# Patient Record
Sex: MALE | Race: WHITE | Employment: OTHER | URBAN - METROPOLITAN AREA
[De-identification: names, ages, dates, MRNs, and addresses within clinical notes are randomized per-mention and may not be internally consistent; named-entity substitution may affect disease eponyms.]

---

## 2017-06-09 ENCOUNTER — TELEPHONE (OUTPATIENT)
Dept: DERMATOLOGY | Facility: AMBULATORY SURGERY CENTER | Age: 57
End: 2017-06-09

## 2017-06-09 NOTE — TELEPHONE ENCOUNTER
Patient Appointment Date: 06/26/2017      Asim Gregory, 60 y.o., male  Is calling for their Mohs Pre-Op Assessment    does not have Hepatitis C or HIV (If YES, set up consult appointment)  does confirm site (if pathology available)    Allergies:  No Known Allergies      does not have a Pacemaker  does not have a Defibrillator    does not need antibiotics      is not taking NSAIDs  . is taking aspirin  If yes, is taking because of arthritis     is not taking garlic  is not taking ginkgo  is not taking Ginseng  is taking fish oils  is not taking vit E    does not take a blood thinner    is not taking Coumadin/Warfarin       Pre operative assessment questions asked to patient. Patient has a general understanding of the procedure, and has been versed that there will be local anesthesia used in the procedure and that He will be ok to drive themselves to and from the appointment.

## 2017-06-26 ENCOUNTER — OFFICE VISIT (OUTPATIENT)
Dept: DERMATOLOGY | Facility: AMBULATORY SURGERY CENTER | Age: 57
End: 2017-06-26

## 2017-06-26 VITALS
HEIGHT: 70 IN | WEIGHT: 195 LBS | HEART RATE: 70 BPM | BODY MASS INDEX: 27.92 KG/M2 | SYSTOLIC BLOOD PRESSURE: 123 MMHG | DIASTOLIC BLOOD PRESSURE: 71 MMHG | OXYGEN SATURATION: 97 % | TEMPERATURE: 98.2 F

## 2017-06-26 DIAGNOSIS — C44.229 SQUAMOUS CELL CARCINOMA, EAR, LEFT: Primary | ICD-10-CM

## 2017-06-26 RX ORDER — BUPIVACAINE HYDROCHLORIDE AND EPINEPHRINE 2.5; 5 MG/ML; UG/ML
INJECTION, SOLUTION INFILTRATION; PERINEURAL
Qty: 1.5 ML | Refills: 0
Start: 2017-06-26 | End: 2018-11-27

## 2017-06-26 RX ORDER — ASPIRIN 325 MG
325 TABLET ORAL DAILY
COMMUNITY
End: 2018-11-28

## 2017-06-26 RX ORDER — METFORMIN HYDROCHLORIDE 1000 MG/1
1000 TABLET ORAL 2 TIMES DAILY WITH MEALS
COMMUNITY
Start: 2017-04-02

## 2017-06-26 RX ORDER — GLIPIZIDE 10 MG/1
10 TABLET ORAL 2 TIMES DAILY
COMMUNITY
Start: 2017-03-20

## 2017-06-26 RX ORDER — LOSARTAN POTASSIUM 100 MG/1
100 TABLET ORAL DAILY
COMMUNITY
Start: 2017-03-20

## 2017-06-26 RX ORDER — EMPAGLIFLOZIN 25 MG/1
TABLET, FILM COATED ORAL
Refills: 0 | COMMUNITY
Start: 2017-06-12

## 2017-06-26 RX ORDER — LANOLIN ALCOHOL/MO/W.PET/CERES
1000 CREAM (GRAM) TOPICAL DAILY
COMMUNITY

## 2017-06-26 RX ORDER — GLUCOSAMINE SULFATE 1500 MG
2000 POWDER IN PACKET (EA) ORAL DAILY
COMMUNITY

## 2017-06-26 RX ORDER — ROSUVASTATIN CALCIUM 20 MG/1
20 TABLET, COATED ORAL
COMMUNITY

## 2017-06-26 RX ORDER — LIDOCAINE HYDROCHLORIDE AND EPINEPHRINE 10; 10 MG/ML; UG/ML
3 INJECTION, SOLUTION INFILTRATION; PERINEURAL ONCE
Qty: 3 ML | Refills: 0
Start: 2017-06-26 | End: 2017-06-26

## 2017-06-26 RX ORDER — FENOFIBRATE 160 MG/1
160 TABLET ORAL DAILY
COMMUNITY
Start: 2017-05-26

## 2017-06-26 NOTE — MR AVS SNAPSHOT
Visit Information Date & Time Provider Department Dept. Phone Encounter #  
 6/26/2017  8:30 AM John Jung MD [facility] [id number] [phone] [id number] Upcoming Health Maintenance Date Due Hepatitis C Screening 1960 DTaP/Tdap/Td series (1 - Tdap) 6/22/1979 FOBT Q 1 YEAR AGE 50-75 6/22/2008 INFLUENZA AGE 9 TO ADULT 8/1/2017 Allergies as of 6/26/2017  Review Complete On: 6/26/2017 By: John Jung MD  
  
 Severity Noted Reaction Type Reactions Other Food  06/26/2017    Nausea and Vomiting Green peppers Shellfish Derived  06/26/2017    Nausea and Vomiting Current Immunizations  Never Reviewed No immunizations on file. Not reviewed this visit You Were Diagnosed With   
  
 Codes Comments Squamous cell carcinoma, ear, left    -  Primary ICD-10-CM: W27.637 ICD-9-CM: 173.22 Vitals BP Pulse Temp Height(growth percentile) Weight(growth percentile) SpO2  
 123/71 (BP 1 Location: Right arm, BP Patient Position: Sitting) 70 98.2 °F (36.8 °C) (Oral) 5' 10\" (1.778 m) 195 lb (88.5 kg) 97% BMI Smoking Status 27.98 kg/m2 Never Smoker BMI and BSA Data Body Mass Index Body Surface Area  
 27.98 kg/m 2 2.09 m 2 Your Updated Medication List  
  
   
This list is accurate as of: 6/26/17  9:09 AM.  Always use your most recent med list.  
  
  
  
  
 aspirin 325 mg tablet Commonly known as:  ASPIRIN Take 325 mg by mouth daily. CRESTOR 20 mg tablet Generic drug:  rosuvastatin Take 20 mg by mouth nightly. cyanocobalamin 1,000 mcg tablet Take 1,000 mcg by mouth daily. fenofibrate 160 mg tablet Commonly known as:  LOFIBRA FISH -160-1,000 mg Cap Generic drug:  omega 3-dha-epa-fish oil Take  by mouth. glipiZIDE 10 mg tablet Commonly known as:  Jose Angel Klippel JARDIANCE 25 mg tablet Generic drug:  empagliflozin TAKE 1 TABLET BY MOUTH DAILY EVERY MORNING  
  
 losartan 100 mg tablet Commonly known as:  COZAAR  
  
 metFORMIN 1,000 mg tablet Commonly known as:  GLUCOPHAGE  
  
 VITAMIN D3 1,000 unit Cap Generic drug:  cholecalciferol Take  by mouth daily. Patient Instructions WOUND CARE INSTRUCTIONS 1. Keep the dressing clean and dry and do not remove for 48 hours. 2. Then change the dressing once a day as follows: 
a. Wash hands before and after each dressing change. b. Remove dressing and wash site gently with mild soap and water, rinse, and pat dry. 
c. Apply an ointment (Bacitracin, Polysporin, Neosporin, Petroleum jelly or Aquaphor). d. Apply a non-stick (Telfa) dressing or Band-Aid to cover the wound. Remove pressure bandage Wednesday, then wash gently and apply Vaseline and a band-aid to site daily for 1 week. 3. Watch for: BLEEDING: A small amount of drainage may occur. If bleeding occurs, elevate and rest the surgery site. Apply gauze and steady pressure for 15 minutes. If bleeding continues, call this office. INFECTION: Signs of infection include increased redness, pain, warmth, drainage of pus, and fever. If this occurs, call this office. 4. Special Instructions (follow any that are checked): ·  You have stitches that need to be removed in 0 days ·  Avoid bending at the waist and heavy lifting for two days. ·  Sleep with your head elevated for the next two nights. ·  Rest the surgery site and keep it elevated as much as possible for two days. ·  You may apply an ice-pack for 10-15 minutes every waking hour for the rest of the day. ·  Eat a soft diet and avoid hot food and hot drinks for the rest of the day. ·  Other instructions: Follow up as directed Take Tylenol for pain as needed.  
 
 
Once the site is healed with no remaining bandages or open areas, protect your surgical site and scar from the sun, as this area will be more sensitive. Use a broad spectrum sunscreen SPF 30 or higher daily, and a chemical free product (one containing zinc oxide or titanium dioxide) is a good choice if the area is sensitive. You may begin to gently massage the surgical site in 2-3 weeks, rubbing in a circular motion along the scar. This can help reduce swelling and thickness of a scar. A scar cream may be used beginnning 1 month after the surgery. If you have any questions or concerns, please call our office Monday through Friday at [phone]. Introducing [facility] HE[facility]! [facility] introduces [employer] patient portal. Now you can access parts of your medical record, email your doctor's office, and request medication refills online. 1. In your internet browser, go to [email or web address] 2. Click on the First Time User? Click Here link in the Sign In box. You will see the New Member Sign Up page. 3. Enter your [employer] Access Code exactly as it appears below. You will not need to use this code after youve completed the sign-up process. If you do not sign up before the expiration date, you must request a new code. · [employer] Access Code: [email or web address] Expires: 9/24/2017  8:37 AM 
 
4. Enter the last four digits of your Social Security Number (xxxx) and Date of Birth (mm/dd/yyyy) as indicated and click Submit. You will be taken to the next sign-up page. 5. Create a Versafet ID. This will be your [employer] login ID and cannot be changed, so think of one that is secure and easy to remember. 6. Create a [employer] password. You can change your password at any time. 7. Enter your Password Reset Question and Answer. This can be used at a later time if you forget your password. 8. Enter your e-mail address. You will receive e-mail notification when new information is available in [address] 9. Click Sign Up. You can now view and download portions of your medical record. 10. Click the Download Summary menu link to download a portable copy of your medical information. If you have questions, please visit the Frequently Asked Questions section of the Call Loop website. Remember, Call Loop is NOT to be used for urgent needs. For medical emergencies, dial 911. Now available from your iPhone and Android! Please provide this summary of care documentation to your next provider. Your primary care clinician is listed as Taran Grijalva MD. If you have any questions after today's visit, please call [phone].

## 2017-06-26 NOTE — PROGRESS NOTES
Pre-op: Patient present today for the evaluation of SCC to the left superior helical rim. Procedure explained with full understanding. Vitals:    06/26/17 0826   BP: 123/71   Pulse: 70   Temp: 98.2 °F (36.8 °C)   TempSrc: Oral   SpO2: 97%   Weight: 88.5 kg (195 lb)   Height: 5' 10\" (1.778 m)     preoperatively, will continue to monitor. Post-op: Written and verbal post-op wound care instructions given to patient with full understanding of care. Surgical wound bandaged with Vaseline, Telfa, skin prep, 2x2 gauze, and coverall tape. All questions and concerns addressed. Vitals stable postoperatively.

## 2017-06-26 NOTE — PROGRESS NOTES
This note is written by Geovanna Barrett, as dictated by Lesa Burgos. Rashad Nunes MD.    CC: Squamous cell carcinoma on the left superior helical rim     History of present illness:     Lisa Wilhelm is a 59 y.o. male referred by Dr. Karishma Curry. 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 This is a squamous cell carcinoma present for less than six months with prior treatment of cryotherapy in the area. Biopsy confirmed the diagnosis of squamous cell carcinoma, and I reviewed the written pathology report. He is feeling well and in his usual state of health today. He has no pain, no current illnesses, no other skin concerns. His allergies, medications, medical, and social history are reviewed by me today. Exam:     He is an awake, alert, and oriented 59 y.o. male who appears well and in no distress. There is no preauricular, submandibular, or cervical lymphadenopathy. I examined his left ear. He has an 8 x 3 mm indistinct pink scar on his left superior helical rim. He confirms location. Assessment/plan:    1. Squamous cell carcinoma, left superior helical rim. I discussed the diagnosis of squamous cell carcinoma and summarized the pathology report. Mohs surgery is indicated by site and poor definition. The procedure was discussed, verbal and written consent were obtained. I performed the procedure. One stage was required to reach a tumor free plane. The surgical defect was managed with intermediate repair. There were no complications. He will follow up as needed as the site heals. Indications, risks, and options were discussed with Lisa Wilhelm preoperatively. Risks including, but not limited to: pain, bleeding, infection, tumor recurrence, scarring and damage to motor and/or sensory nerves, were discussed. Lisa Wilhelm chose Mohs surgery. Lisa Wilhelm was an acceptable surgery candidate.     Lisa Wilhelm was placed in the appropriate position on the operating table in the Mohs surgery procedure room. The area was prepped and draped in the standard manner. Gentian violet was used to outline the clinical margins of the tumor. Local anesthesia was then obtained. The grossly visible tumor was then removed, an underlying layer was excised and mapped according to the Mohs technique, and the individual specimens examined microscopically. The process was repeated until microscopic examination of the tissue specimens confirmed a tumor-free plane. Hemostasis was obtained with electrosurgery and pressure. The wound was covered between stages with moist saline gauze. The wound management options of second intent healing, layered closure, local flap, and/or full thickness skin graft were discussed. Roverto Guzmán understands the aims, risks, alternatives, and possible complications and elects to proceed with an intermediate layered closure. Wound margins were made vertical, edges undermined in the perichondrial plane, standing cones corrected at both poles followed by layered closure. The wound was closed with buried 6-0 polysorb suture in the subcutis to reduce tension on the skin edges. The final closure length was 30 mm. The wound was bandaged with skin glue, Telfa, gauze and Coverroll. Wound care instructions (written and verbal) and a follow up appointment were given to Roverto Guzmán before discharge. Roverto Guzmán was discharged in good condition. 2. History of skin cancer. I discussed the diagnosis and recommend routine examinations with Dr. Richardson Canavan for surveillance. The documentation recorded by the scribe accurately reflects the service I personally performed and the decisions made by me. [facility]   OFFICE PROCEDURE PROGRESS NOTE     Chart reviewed for the following:     Milton Mckinney MD, have reviewed the History, Physical and updated the Allergic reactions for Barták[place] Raven performed immediately prior to start of procedure:     Alejandra Stahl MD, have performed the following reviews on Gabriela Schuler prior to the start of the procedure:     * Patient was identified by name and date of birth   * Agreement on procedure being performed was verified   * Risks and Benefits explained to the patient   * Procedure site verified and marked as necessary   * Patient was positioned for comfort   * Consent was signed and verified     Time: 8:42 AM   Date of procedure: 6/26/2017  Procedure performed by: Mary Jane Stahl MD   Provider assisted by: LPN   Patient assisted by: self   How tolerated by patient: tolerated the procedure well with no complications   Comments: none

## 2017-06-26 NOTE — PATIENT INSTRUCTIONS
WOUND CARE INSTRUCTIONS    1. Keep the dressing clean and dry and do not remove for 48 hours. 2. Then change the dressing once a day as follows:  a. Wash hands before and after each dressing change. b. Remove dressing and wash site gently with mild soap and water, rinse, and pat dry.  c. Apply an ointment (Bacitracin, Polysporin, Neosporin, Petroleum jelly or Aquaphor). d. Apply a non-stick (Telfa) dressing or Band-Aid to cover the wound. Remove pressure bandage Wednesday, then wash gently and apply Vaseline and a band-aid to site daily for 1 week. 3. Watch for:  BLEEDING: A small amount of drainage may occur. If bleeding occurs, elevate and rest the surgery site. Apply gauze and steady pressure for 15 minutes. If bleeding continues, call this office. INFECTION: Signs of infection include increased redness, pain, warmth, drainage of pus, and fever. If this occurs, call this office. 4. Special Instructions (follow any that are checked):  · [] You have stitches that need to be removed in 0 days  · [x] Avoid bending at the waist and heavy lifting for two days. · [x] Sleep with your head elevated for the next two nights. · [x] Rest the surgery site and keep it elevated as much as possible for two days. · [x] You may apply an ice-pack for 10-15 minutes every waking hour for the rest of the day. · [] Eat a soft diet and avoid hot food and hot drinks for the rest of the day. · [] Other instructions: Follow up as directed  Take Tylenol for pain as needed. Once the site is healed with no remaining bandages or open areas, protect your surgical site and scar from the sun, as this area will be more sensitive. Use a broad spectrum sunscreen SPF 30 or higher daily, and a chemical free product (one containing zinc oxide or titanium dioxide) is a good choice if the area is sensitive. You may begin to gently massage the surgical site in 2-3 weeks, rubbing in a circular motion along the scar.  This can help reduce swelling and thickness of a scar. A scar cream may be used beginnning 1 month after the surgery. If you have any questions or concerns, please call our office Monday through Friday at [phone].

## 2018-10-08 ENCOUNTER — OFFICE VISIT (OUTPATIENT)
Dept: SURGERY | Age: 58
End: 2018-10-08

## 2018-10-08 VITALS
OXYGEN SATURATION: 96 % | RESPIRATION RATE: 20 BRPM | TEMPERATURE: 98.7 F | SYSTOLIC BLOOD PRESSURE: 160 MMHG | WEIGHT: 197 LBS | HEIGHT: 70 IN | HEART RATE: 80 BPM | DIASTOLIC BLOOD PRESSURE: 87 MMHG | BODY MASS INDEX: 28.2 KG/M2

## 2018-10-08 DIAGNOSIS — K40.90 LEFT INGUINAL HERNIA: Primary | ICD-10-CM

## 2018-10-08 DIAGNOSIS — K42.9 UMBILICAL HERNIA WITHOUT OBSTRUCTION AND WITHOUT GANGRENE: ICD-10-CM

## 2018-10-08 NOTE — PROGRESS NOTES
[facility] General Surgery History and Physical 
 
History of Present Illness:  
  
Erin Flannery is a 59 y.o. male who has a left inguinal hernia and umbilical hernia. He has been having pain in the L groin for the past few months. He noticed the hernia a few years ago. He does notice a bulge in the L groin. The pain is caused by heavy lifting and straining. The pain is a 3 of 10. He does not have any changes in BM. Past Medical History:  
Diagnosis Date  Diabetes ([facility] [address]  Diabetes ([facility] [address]  Family history of skin cancer  Hypertension  Skin cancer  Sun-damaged skin  Sunburn, blistering Past Surgical History:  
Procedure Laterality Date  HX MOHS PROCEDURES  06/26/2017 SCC L superior helical rim by Dr. Lenore Nguyen Current Outpatient Prescriptions:  
  glipiZIDE (GLUCOTROL) 10 mg tablet, , Disp: , Rfl:  
  metFORMIN (GLUCOPHAGE) 1,000 mg tablet, , Disp: , Rfl:  
  JARDIANCE 25 mg tablet, TAKE 1 TABLET BY MOUTH DAILY EVERY MORNING, Disp: , Rfl: 0 
  fenofibrate (LOFIBRA) 160 mg tablet, , Disp: , Rfl:  
  losartan (COZAAR) 100 mg tablet, , Disp: , Rfl:  
  rosuvastatin (CRESTOR) 20 mg tablet, Take 20 mg by mouth nightly., Disp: , Rfl:  
  aspirin (ASPIRIN) 325 mg tablet, Take 325 mg by mouth daily. , Disp: , Rfl:  
  omega 3-dha-epa-fish oil (FISH OIL) 100-160-1,000 mg cap, Take  by mouth., Disp: , Rfl:  
  cyanocobalamin 1,000 mcg tablet, Take 1,000 mcg by mouth daily. , Disp: , Rfl:  
  cholecalciferol (VITAMIN D3) 1,000 unit cap, Take  by mouth daily. , Disp: , Rfl:  
  bupivacaine-EPINEPHrine (MARCAINE-EPINEPHRINE) 0.25 %-1:200,000 soln, Used for MOH's surgery, Disp: 1.5 mL, Rfl: 0 Allergies Allergen Reactions  Other Food Nausea and Vomiting Green peppers  Shellfish Derived Nausea and Vomiting Social History Social History  Marital status:    Spouse name: N/A  
 Number of children: N/A  
 Years of education: N/A  
 
 Occupational History  Not on file. Social History Main Topics  Smoking status: Never Smoker  Smokeless tobacco: Never Used  Alcohol use Yes  Drug use: No  
 Sexual activity: Not on file Other Topics Concern  Not on file Social History Narrative No family history on file. ROS Constitutional: negative Ears, Nose, Mouth, Throat, and Face: negative Respiratory: negative Cardiovascular: negative Gastrointestinal: left groin pain Genitourinary:negative Integument/Breast: negative Hematologic/Lymphatic: negative Behavioral/Psychiatric: negative Allergic/Immunologic: negative Physical Exam:  
 
Visit Vitals  /87 (BP 1 Location: Left arm, BP Patient Position: Sitting)  Pulse 80  Temp 98.7 °F (37.1 °C) (Oral)  Resp 20  
 Ht 5' 10\" (1.778 m)  Wt 197 lb (89.4 kg)  SpO2 96%  BMI 28.27 kg/m2 General - alert and oriented, no apparent distress HEENT - no jaundice, no hearing imparement Pulm - CTAB, no C/W/R 
CV - RRR, no M/R/G Abd - soft, ND, BS present, small <9SV umbilical hernia, left inguinal hernia present, soft, mild TTP, partially reducible Ext - pulses intact in UE and LE bilaterally, no edema Skin - supple, no rashes Psychiatric - normal affect, good mood Labs 
none Imaging 
none I have reviewed and agree with all of the pertinent images Assessment:  
 
Christiana Cunningham is a 59 y.o. male with left umbilical hernia and umbilical hernia Recommendations: 1. He will need a laparoscopic left inguinal hernia repair with mesh and open umbilical hernia repair. I have discussed the above procedure with the patient in detail. We reviewed the benefits and possible complications of the surgery which include bleeding, infection, damage to adjacent organs, venous thromboembolism, need for repeat surgery, death and other unforseen complications. The patient agreed to proceed with the surgery.    
 
 
Berny Jackson MD 
 
 Mr. Baron Mccartney has a reminder for a \"due or due soon\" health maintenance. I have asked that he contact his primary care provider for follow-up on this health maintenance.

## 2018-10-08 NOTE — H&P (VIEW-ONLY)
[facility] General Surgery History and Physical 
 
History of Present Illness:  
  
Zohra Jin is a 59 y.o. male who has a left inguinal hernia and umbilical hernia. He has been having pain in the L groin for the past few months. He noticed the hernia a few years ago. He does notice a bulge in the L groin. The pain is caused by heavy lifting and straining. The pain is a 3 of 10. He does not have any changes in BM. Past Medical History:  
Diagnosis Date  Diabetes ([address]  Diabetes ([facility] [address]  Family history of skin cancer  Hypertension  Skin cancer  Sun-damaged skin  Sunburn, blistering Past Surgical History:  
Procedure Laterality Date  HX MOHS PROCEDURES  06/26/2017 SCC L superior helical rim by Dr. Marcus Cortes Current Outpatient Prescriptions:  
  glipiZIDE (GLUCOTROL) 10 mg tablet, , Disp: , Rfl:  
  metFORMIN (GLUCOPHAGE) 1,000 mg tablet, , Disp: , Rfl:  
  JARDIANCE 25 mg tablet, TAKE 1 TABLET BY MOUTH DAILY EVERY MORNING, Disp: , Rfl: 0 
  fenofibrate (LOFIBRA) 160 mg tablet, , Disp: , Rfl:  
  losartan (COZAAR) 100 mg tablet, , Disp: , Rfl:  
  rosuvastatin (CRESTOR) 20 mg tablet, Take 20 mg by mouth nightly., Disp: , Rfl:  
  aspirin (ASPIRIN) 325 mg tablet, Take 325 mg by mouth daily. , Disp: , Rfl:  
  omega 3-dha-epa-fish oil (FISH OIL) 100-160-1,000 mg cap, Take  by mouth., Disp: , Rfl:  
  cyanocobalamin 1,000 mcg tablet, Take 1,000 mcg by mouth daily. , Disp: , Rfl:  
  cholecalciferol (VITAMIN D3) 1,000 unit cap, Take  by mouth daily. , Disp: , Rfl:  
  bupivacaine-EPINEPHrine (MARCAINE-EPINEPHRINE) 0.25 %-1:200,000 soln, Used for MOH's surgery, Disp: 1.5 mL, Rfl: 0 Allergies Allergen Reactions  Other Food Nausea and Vomiting Green peppers  Shellfish Derived Nausea and Vomiting Social History Social History  Marital status:    Spouse name: N/A  
 Number of children: N/A  
 Years of education: N/A  
 
 Occupational History  Not on file. Social History Main Topics  Smoking status: Never Smoker  Smokeless tobacco: Never Used  Alcohol use Yes  Drug use: No  
 Sexual activity: Not on file Other Topics Concern  Not on file Social History Narrative No family history on file. ROS Constitutional: negative Ears, Nose, Mouth, Throat, and Face: negative Respiratory: negative Cardiovascular: negative Gastrointestinal: left groin pain Genitourinary:negative Integument/Breast: negative Hematologic/Lymphatic: negative Behavioral/Psychiatric: negative Allergic/Immunologic: negative Physical Exam:  
 
Visit Vitals  /87 (BP 1 Location: Left arm, BP Patient Position: Sitting)  Pulse 80  Temp 98.7 °F (37.1 °C) (Oral)  Resp 20  
 Ht 5' 10\" (1.778 m)  Wt 197 lb (89.4 kg)  SpO2 96%  BMI 28.27 kg/m2 General - alert and oriented, no apparent distress HEENT - no jaundice, no hearing imparement Pulm - CTAB, no C/W/R 
CV - RRR, no M/R/G Abd - soft, ND, BS present, small <3VU umbilical hernia, left inguinal hernia present, soft, mild TTP, partially reducible Ext - pulses intact in UE and LE bilaterally, no edema Skin - supple, no rashes Psychiatric - normal affect, good mood Labs 
none Imaging 
none I have reviewed and agree with all of the pertinent images Assessment:  
 
Sofy Lai is a 59 y.o. male with left umbilical hernia and umbilical hernia Recommendations: 1. He will need a laparoscopic left inguinal hernia repair with mesh and open umbilical hernia repair. I have discussed the above procedure with the patient in detail. We reviewed the benefits and possible complications of the surgery which include bleeding, infection, damage to adjacent organs, venous thromboembolism, need for repeat surgery, death and other unforseen complications. The patient agreed to proceed with the surgery.    
 
 
Lina Lee MD 
 
 Mr. Zuhair Pollack has a reminder for a \"due or due soon\" health maintenance. I have asked that he contact his primary care provider for follow-up on this health maintenance.

## 2018-10-08 NOTE — LETTER
10/8/2018 3:28 PM 
 
Mr. Orquidea Vides [address] [id number] Surgery is scheduled as follows: 
 
Surgery date: Friday, 10/19/2018    [facility]: [facility] 
 
Arrival time: 5:30 AM     Start time: 7:30 AM 
 
DO NOT EAT OR DRINK ANYTHING PAST MIDNIGHT THE NIGHT BEFORE SURGERY 
______________________________________________________________________ Pre-Registration: Date: Wednesday, 10/10/2018     Time: 8:00 AM    
Location: [facility] [address] ([facility]) [address] PLEASE ARRIVE 15-20 MINUTES EARLY TO ALLOW FOR REGISTRATION The nurses will review your medications with you at this time and also obtain the pre-testing that the doctor has ordered. Please allow approximately 1.5 hours for this appointment. 1st Post Operative appointment:  
 
Monday, 11/05/2018 at 10:00 AM with CAROLYNN Corona [address] Office Phone: [phone] For questions regarding this information please contact Jose Looney at [phone]. Sincerely, Jose Looney Surgical Scheduler Pre-Operative Instructions **DO NOT EAT or DRINK ANYTHING AFTER MIDNIGHT THE NIGHT BEFORE YOUR SURGERY** 
Please report to Patient Registration/Admitting at the time given to you by your Surgeons office  Located at the [address] [place] [address] single family member may accompany you to the pre-operative waiting area until you are called to be prepped for surgery. Family members will be escorted to a waiting room while you are in surgery.  
If  your physical condition changes (fever, cold, flu), please contact your Surgeon as soon as possible. DO BRING your Insurance card and a photo ID, such as a Drivers License. Valuables are to be left at home. DO NOT wear make-up, lotion, powder, perfume/cologne/aftershave, or nail polish (unless clear). You may wear deodorant. DO NOT wear metal objects such as jewelry or hair pins. Remove all piercings/body jewelry. WEAR COMFORTABLE CLOTHING THAT WILL BE EASY TO REMOVE. If you are staying overnight, please pack a bag and leave it in your vehicle until after surgery. We recommend that you pack your toiletry items, a robe/pajamas, slippers or any other items that you need for your comfort. Have a family member deliver your bag to your room after your surgery  the Hospital does not a secure location to store these personal items. You may bring a case for glasses, contact lenses, or hearing aids. Denture cups are provided by the [address] [place] [address] Road OR AFTER YOUR SURGERY. YOU SHOULD NOT OPERATE A VEHICLE FOR 24 HOURS AFTER RECEIVING SEDATION OR ANESTHESIA. Please arrange for a family member or friend to drive you home after your surgery: You will not be allowed to take a cab or drive yourself home. If you are discharged the day of surgery, it is recommended that you have someone stay with you until the next morning. For questions regarding the above information, please contact the [facility]  office at [phone].

## 2018-10-08 NOTE — LETTER
10/8/2018 4:32 PM 
 
Patient:  Sofy Lai YOB: 1960 Date of Visit: 10/8/2018 Dear Ghassan Oliva MD 
[address] [facility] Endocrinology   Osteoporosis [place] [address] VIA In Basket 
 : Thank you for referring Mr. Aarti Orta to me for evaluation/treatment. Below are the relevant portions of my assessment and plan of care. If you have questions, please do not hesitate to call me. I look forward to following Mr. Jordan along with you. Sincerely, Coco Haley MD

## 2018-10-08 NOTE — PROGRESS NOTES
1. Have you been to the ER, urgent care clinic since your last visit? Hospitalized since your last visit? No 
 
2. Have you seen or consulted any other health care providers outside of the [address] [place] [address] since your last visit? Include any pap smears or colon screening.  No

## 2018-10-08 NOTE — MR AVS SNAPSHOT
[address] [facility] [address] [address] 
[phone] Patient: Rickie Dodson MRN: [id number]:[id number] Visit Information Date & Time Provider Department Dept. Phone Encounter #  
 10/8/2018  2:20 PM Cristiano Kiran MD [address] [facility] [id number] Upcoming Health Maintenance Date Due Hepatitis C Screening 1960 DTaP/Tdap/Td series (1 - Tdap) 6/22/1979 Shingrix Vaccine Age 50> (1 of 2) 6/22/2008 FOBT Q 1 YEAR AGE 50-75 6/22/2008 Influenza Age 5 to Adult 8/1/2018 Allergies as of 10/8/2018  Review Complete On: 10/8/2018 By: Pallavi Ly Severity Noted Reaction Type Reactions Other Food  06/26/2017    Nausea and Vomiting Green peppers Shellfish Derived  06/26/2017    Nausea and Vomiting Current Immunizations  Never Reviewed No immunizations on file. Not reviewed this visit Vitals BP Pulse Temp Resp Height(growth percentile) Weight(growth percentile) 160/87 (BP 1 Location: Left arm, BP Patient Position: Sitting) 80 98.7 °F (37.1 °C) (Oral) 20 5' 10\" (1.778 m) 197 lb (89.4 kg) SpO2 BMI Smoking Status 96% 28.27 kg/m2 Never Smoker Vitals History BMI and BSA Data Body Mass Index Body Surface Area  
 28.27 kg/m 2 2.1 m 2 Your Updated Medication List  
  
   
This list is accurate as of 10/8/18  2:20 PM.  Always use your most recent med list.  
  
  
  
  
 aspirin 325 mg tablet Commonly known as:  ASPIRIN Take 325 mg by mouth daily. bupivacaine-EPINEPHrine 0.25 %-1:200,000 Soln Commonly known as:  General Je Used for [facility] surgery CRESTOR 20 mg tablet Generic drug:  rosuvastatin Take 20 mg by mouth nightly. cyanocobalamin 1,000 mcg tablet Take 1,000 mcg by mouth daily. fenofibrate 160 mg tablet Commonly known as:  LOFIBRA FISH -160-1,000 mg Cap Generic drug:  omega 3-dha-epa-fish oil Take  by mouth. glipiZIDE 10 mg tablet Commonly known as:  Sammie Searing JARDIANCE 25 mg tablet Generic drug:  empagliflozin TAKE 1 TABLET BY MOUTH DAILY EVERY MORNING  
  
 losartan 100 mg tablet Commonly known as:  COZAAR  
  
 metFORMIN 1,000 mg tablet Commonly known as:  GLUCOPHAGE  
  
 VITAMIN D3 1,000 unit Cap Generic drug:  cholecalciferol Take  by mouth daily. Introducing [facility]! [employer] introduces Sealed patient portal. Now you can access parts of your medical record, email your doctor's office, and request medication refills online. 1. In your internet browser, go to [email or web address] 2. Click on the First Time User? Click Here link in the Sign In box. You will see the New Member Sign Up page. 3. Enter your Sealed Access Code exactly as it appears below. You will not need to use this code after youve completed the sign-up process. If you do not sign up before the expiration date, you must request a new code. · Sealed Access Code: [email or web address] Expires: 1/6/2019  2:20 PM 
 
4. Enter the last four digits of your Social Security Number (xxxx) and Date of Birth (mm/dd/yyyy) as indicated and click Submit. You will be taken to the next sign-up page. 5. Create a Sealed ID. This will be your Sealed login ID and cannot be changed, so think of one that is secure and easy to remember. 6. Create a Sealed password. You can change your password at any time. 7. Enter your Password Reset Question and Answer. This can be used at a later time if you forget your password. 8. Enter your e-mail address. You will receive e-mail notification when new information is available in [address] 9. Click Sign Up. You can now view and download portions of your medical record. 10. Click the Download Summary menu link to download a portable copy of your medical information. If you have questions, please visit the Frequently Asked Questions section of the [employer] website. Remember, [employer] is NOT to be used for urgent needs. For medical emergencies, dial 911. Now available from your iPhone and Android! Please provide this summary of care documentation to your next provider. Your primary care clinician is listed as Cristin Lorenzo MD. If you have any questions after today's visit, please call [phone].

## 2018-10-10 ENCOUNTER — HOSPITAL ENCOUNTER (OUTPATIENT)
Dept: PREADMISSION TESTING | Age: 58
Discharge: HOME | End: 2018-10-10
Payer: COMMERCIAL

## 2018-10-10 VITALS
SYSTOLIC BLOOD PRESSURE: 154 MMHG | HEIGHT: 70 IN | HEART RATE: 63 BPM | TEMPERATURE: 98.1 F | BODY MASS INDEX: 27.63 KG/M2 | DIASTOLIC BLOOD PRESSURE: 80 MMHG | WEIGHT: 193 LBS

## 2018-10-10 LAB
ANION GAP SERPL CALC-SCNC: 8 MMOL/L (ref 5–15)
ATRIAL RATE: 63 BPM
BASOPHILS # BLD: 0.1 K/UL (ref 0–0.1)
BASOPHILS NFR BLD: 1 % (ref 0–1)
BUN SERPL-MCNC: 21 MG/DL (ref 6–20)
BUN/CREAT SERPL: 21 (ref 12–20)
CALCIUM SERPL-MCNC: 9.7 MG/DL (ref 8.5–10.1)
CALCULATED P AXIS, ECG09: 24 DEGREES
CALCULATED R AXIS, ECG10: 5 DEGREES
CALCULATED T AXIS, ECG11: 49 DEGREES
CHLORIDE SERPL-SCNC: 105 MMOL/L (ref 97–108)
CO2 SERPL-SCNC: 28 MMOL/L (ref 21–32)
CREAT SERPL-MCNC: 1.02 MG/DL (ref 0.7–1.3)
DIAGNOSIS, 93000: NORMAL
DIFFERENTIAL METHOD BLD: NORMAL
EOSINOPHIL # BLD: 0.2 K/UL (ref 0–0.4)
EOSINOPHIL NFR BLD: 3 % (ref 0–7)
ERYTHROCYTE [DISTWIDTH] IN BLOOD BY AUTOMATED COUNT: 13.2 % (ref 11.5–14.5)
EST. AVERAGE GLUCOSE BLD GHB EST-MCNC: 157 MG/DL
GLUCOSE SERPL-MCNC: 198 MG/DL (ref 65–100)
HBA1C MFR BLD: 7.1 % (ref 4.2–6.3)
HCT VFR BLD AUTO: 45.9 % (ref 36.6–50.3)
HGB BLD-MCNC: 15 G/DL (ref 12.1–17)
IMM GRANULOCYTES # BLD: 0 K/UL (ref 0–0.04)
IMM GRANULOCYTES NFR BLD AUTO: 0 % (ref 0–0.5)
LYMPHOCYTES # BLD: 1.8 K/UL (ref 0.8–3.5)
LYMPHOCYTES NFR BLD: 30 % (ref 12–49)
MCH RBC QN AUTO: 29.5 PG (ref 26–34)
MCHC RBC AUTO-ENTMCNC: 32.7 G/DL (ref 30–36.5)
MCV RBC AUTO: 90.4 FL (ref 80–99)
MONOCYTES # BLD: 0.4 K/UL (ref 0–1)
MONOCYTES NFR BLD: 7 % (ref 5–13)
NEUTS SEG # BLD: 3.5 K/UL (ref 1.8–8)
NEUTS SEG NFR BLD: 59 % (ref 32–75)
NRBC # BLD: 0 K/UL (ref 0–0.01)
NRBC BLD-RTO: 0 PER 100 WBC
P-R INTERVAL, ECG05: 196 MS
PLATELET # BLD AUTO: 348 K/UL (ref 150–400)
PMV BLD AUTO: 10.3 FL (ref 8.9–12.9)
POTASSIUM SERPL-SCNC: 4.2 MMOL/L (ref 3.5–5.1)
Q-T INTERVAL, ECG07: 388 MS
QRS DURATION, ECG06: 92 MS
QTC CALCULATION (BEZET), ECG08: 397 MS
RBC # BLD AUTO: 5.08 M/UL (ref 4.1–5.7)
SODIUM SERPL-SCNC: 141 MMOL/L (ref 136–145)
VENTRICULAR RATE, ECG03: 63 BPM
WBC # BLD AUTO: 5.9 K/UL (ref 4.1–11.1)

## 2018-10-10 PROCEDURE — 93005 ELECTROCARDIOGRAM TRACING: CPT

## 2018-10-10 PROCEDURE — 80048 BASIC METABOLIC PNL TOTAL CA: CPT | Performed by: SURGERY

## 2018-10-10 PROCEDURE — 85025 COMPLETE CBC W/AUTO DIFF WBC: CPT | Performed by: SURGERY

## 2018-10-10 PROCEDURE — 36415 COLL VENOUS BLD VENIPUNCTURE: CPT | Performed by: SURGERY

## 2018-10-10 PROCEDURE — 83036 HEMOGLOBIN GLYCOSYLATED A1C: CPT | Performed by: SURGERY

## 2018-10-11 NOTE — PERIOP NOTES
VIA [facility] NOTE SENT TO ALLA ESPINOZA - NURSE FOR DR. El STOCKTON AS FOLLOWS. .. HI ALLA PATIENT GRAZYNA WALSH  56 PREOP APPT FOR SURGERY ON 10/19/18 HAD ABNORMAL LABS AS FOLLOWS. .. HGAIC 7.1 (H) GLUCOSE 198 (H) THANKS Lily Moise RN

## 2018-10-18 ENCOUNTER — ANESTHESIA EVENT (OUTPATIENT)
Dept: SURGERY | Age: 58
End: 2018-10-18
Payer: COMMERCIAL

## 2018-10-19 ENCOUNTER — HOSPITAL ENCOUNTER (OUTPATIENT)
Age: 58
Setting detail: OUTPATIENT SURGERY
Discharge: HOME | End: 2018-10-19
Attending: SURGERY | Admitting: SURGERY
Payer: COMMERCIAL

## 2018-10-19 ENCOUNTER — ANESTHESIA (OUTPATIENT)
Dept: SURGERY | Age: 58
End: 2018-10-19
Payer: COMMERCIAL

## 2018-10-19 VITALS
SYSTOLIC BLOOD PRESSURE: 143 MMHG | OXYGEN SATURATION: 95 % | WEIGHT: 193 LBS | HEART RATE: 68 BPM | RESPIRATION RATE: 9 BRPM | HEIGHT: 70 IN | BODY MASS INDEX: 27.63 KG/M2 | DIASTOLIC BLOOD PRESSURE: 87 MMHG | TEMPERATURE: 97.6 F

## 2018-10-19 DIAGNOSIS — K40.90 LEFT INGUINAL HERNIA: Primary | ICD-10-CM

## 2018-10-19 LAB
GLUCOSE BLD STRIP.AUTO-MCNC: 132 MG/DL (ref 65–100)
GLUCOSE BLD STRIP.AUTO-MCNC: 186 MG/DL (ref 65–100)
SERVICE CMNT-IMP: ABNORMAL
SERVICE CMNT-IMP: ABNORMAL

## 2018-10-19 PROCEDURE — 77030009964 HC RELD STPLR ENDOS COVD -B: Performed by: SURGERY

## 2018-10-19 PROCEDURE — 77030035048 HC TRCR ENDOSC OPTCL COVD -B: Performed by: SURGERY

## 2018-10-19 PROCEDURE — 77030002895 HC DEV VASC CLOSR COVD -B: Performed by: SURGERY

## 2018-10-19 PROCEDURE — 77030019908 HC STETH ESOPH SIMS -A: Performed by: ANESTHESIOLOGY

## 2018-10-19 PROCEDURE — 77030018836 HC SOL IRR NACL ICUM -A: Performed by: SURGERY

## 2018-10-19 PROCEDURE — 76010000153 HC OR TIME 1.5 TO 2 HR: Performed by: SURGERY

## 2018-10-19 PROCEDURE — 77030002933 HC SUT MCRYL J&J -A: Performed by: SURGERY

## 2018-10-19 PROCEDURE — 77030011811 HC STPLR ENDOSC COVD -C: Performed by: SURGERY

## 2018-10-19 PROCEDURE — 77030035051: Performed by: SURGERY

## 2018-10-19 PROCEDURE — 76210000020 HC REC RM PH II FIRST 0.5 HR: Performed by: SURGERY

## 2018-10-19 PROCEDURE — 74011250636 HC RX REV CODE- 250/636

## 2018-10-19 PROCEDURE — 77030020747 HC TU INSUF ENDOSC TELE -A: Performed by: SURGERY

## 2018-10-19 PROCEDURE — C1781 MESH (IMPLANTABLE): HCPCS | Performed by: SURGERY

## 2018-10-19 PROCEDURE — 82962 GLUCOSE BLOOD TEST: CPT

## 2018-10-19 PROCEDURE — 74011000250 HC RX REV CODE- 250: Performed by: SURGERY

## 2018-10-19 PROCEDURE — 77030026438 HC STYL ET INTUB CARD -A: Performed by: ANESTHESIOLOGY

## 2018-10-19 PROCEDURE — 77030031139 HC SUT VCRL2 J&J -A: Performed by: SURGERY

## 2018-10-19 PROCEDURE — 77030008684 HC TU ET CUF COVD -B: Performed by: ANESTHESIOLOGY

## 2018-10-19 PROCEDURE — 74011250636 HC RX REV CODE- 250/636: Performed by: ANESTHESIOLOGY

## 2018-10-19 PROCEDURE — 74011000250 HC RX REV CODE- 250

## 2018-10-19 PROCEDURE — 77030039266 HC ADH SKN EXOFIN S2SG -A: Performed by: SURGERY

## 2018-10-19 PROCEDURE — 77030018548 HC SUT ETHBND2 J&J -B: Performed by: SURGERY

## 2018-10-19 PROCEDURE — 76210000017 HC OR PH I REC 1.5 TO 2 HR: Performed by: SURGERY

## 2018-10-19 PROCEDURE — 74011250636 HC RX REV CODE- 250/636: Performed by: SURGERY

## 2018-10-19 PROCEDURE — 77030037032 HC INSRT SCIS CLICKLLINE DISP STOR -B: Performed by: SURGERY

## 2018-10-19 PROCEDURE — 77030032490 HC SLV COMPR SCD KNE COVD -B: Performed by: SURGERY

## 2018-10-19 PROCEDURE — 77030011640 HC PAD GRND REM COVD -A: Performed by: SURGERY

## 2018-10-19 PROCEDURE — 76060000034 HC ANESTHESIA 1.5 TO 2 HR: Performed by: SURGERY

## 2018-10-19 PROCEDURE — 77030035045 HC TRCR ENDOSC VRSPRT BLDLSS COVD -B: Performed by: SURGERY

## 2018-10-19 DEVICE — LAPAROSCOPIC SELF-FIXATING MESH POLYESTER WITH POLYLACTIC ACID GRIPS AND COLLAGEN FILM
Type: IMPLANTABLE DEVICE | Site: GROIN | Status: FUNCTIONAL
Brand: PROGRIP

## 2018-10-19 RX ORDER — GLYCOPYRROLATE 0.2 MG/ML
0.2 INJECTION INTRAMUSCULAR; INTRAVENOUS
Status: DISCONTINUED | OUTPATIENT
Start: 2018-10-19 | End: 2018-10-19 | Stop reason: HOSPADM

## 2018-10-19 RX ORDER — ACETAMINOPHEN 10 MG/ML
INJECTION, SOLUTION INTRAVENOUS AS NEEDED
Status: DISCONTINUED | OUTPATIENT
Start: 2018-10-19 | End: 2018-10-19 | Stop reason: HOSPADM

## 2018-10-19 RX ORDER — CEFAZOLIN SODIUM/WATER 2 G/20 ML
2 SYRINGE (ML) INTRAVENOUS ONCE
Status: COMPLETED | OUTPATIENT
Start: 2018-10-19 | End: 2018-10-19

## 2018-10-19 RX ORDER — OXYCODONE AND ACETAMINOPHEN 5; 325 MG/1; MG/1
1-2 TABLET ORAL
Qty: 40 TAB | Refills: 0 | Status: SHIPPED | OUTPATIENT
Start: 2018-10-19 | End: 2018-11-27

## 2018-10-19 RX ORDER — EPHEDRINE SULFATE 50 MG/ML
5 INJECTION, SOLUTION INTRAVENOUS AS NEEDED
Status: DISCONTINUED | OUTPATIENT
Start: 2018-10-19 | End: 2018-10-19 | Stop reason: HOSPADM

## 2018-10-19 RX ORDER — SODIUM CHLORIDE 9 MG/ML
25 INJECTION, SOLUTION INTRAVENOUS CONTINUOUS
Status: DISCONTINUED | OUTPATIENT
Start: 2018-10-19 | End: 2018-10-19 | Stop reason: HOSPADM

## 2018-10-19 RX ORDER — MIDAZOLAM HYDROCHLORIDE 1 MG/ML
INJECTION, SOLUTION INTRAMUSCULAR; INTRAVENOUS AS NEEDED
Status: DISCONTINUED | OUTPATIENT
Start: 2018-10-19 | End: 2018-10-19 | Stop reason: HOSPADM

## 2018-10-19 RX ORDER — ONDANSETRON 2 MG/ML
INJECTION INTRAMUSCULAR; INTRAVENOUS AS NEEDED
Status: DISCONTINUED | OUTPATIENT
Start: 2018-10-19 | End: 2018-10-19 | Stop reason: HOSPADM

## 2018-10-19 RX ORDER — BUPIVACAINE HYDROCHLORIDE AND EPINEPHRINE 5; 5 MG/ML; UG/ML
INJECTION, SOLUTION EPIDURAL; INTRACAUDAL; PERINEURAL AS NEEDED
Status: DISCONTINUED | OUTPATIENT
Start: 2018-10-19 | End: 2018-10-19 | Stop reason: HOSPADM

## 2018-10-19 RX ORDER — SODIUM CHLORIDE 0.9 % (FLUSH) 0.9 %
SYRINGE (ML) INJECTION
Status: COMPLETED
Start: 2018-10-19 | End: 2018-10-19

## 2018-10-19 RX ORDER — FENTANYL CITRATE 50 UG/ML
INJECTION, SOLUTION INTRAMUSCULAR; INTRAVENOUS AS NEEDED
Status: DISCONTINUED | OUTPATIENT
Start: 2018-10-19 | End: 2018-10-19 | Stop reason: HOSPADM

## 2018-10-19 RX ORDER — LIDOCAINE HYDROCHLORIDE 10 MG/ML
0.1 INJECTION, SOLUTION EPIDURAL; INFILTRATION; INTRACAUDAL; PERINEURAL AS NEEDED
Status: DISCONTINUED | OUTPATIENT
Start: 2018-10-19 | End: 2018-10-19 | Stop reason: HOSPADM

## 2018-10-19 RX ORDER — MIDAZOLAM HYDROCHLORIDE 1 MG/ML
1 INJECTION, SOLUTION INTRAMUSCULAR; INTRAVENOUS AS NEEDED
Status: DISCONTINUED | OUTPATIENT
Start: 2018-10-19 | End: 2018-10-19 | Stop reason: HOSPADM

## 2018-10-19 RX ORDER — FENTANYL CITRATE 50 UG/ML
25 INJECTION, SOLUTION INTRAMUSCULAR; INTRAVENOUS
Status: DISCONTINUED | OUTPATIENT
Start: 2018-10-19 | End: 2018-10-19 | Stop reason: HOSPADM

## 2018-10-19 RX ORDER — HYDROCODONE BITARTRATE AND ACETAMINOPHEN 5; 325 MG/1; MG/1
1 TABLET ORAL AS NEEDED
Status: DISCONTINUED | OUTPATIENT
Start: 2018-10-19 | End: 2018-10-19 | Stop reason: HOSPADM

## 2018-10-19 RX ORDER — HYDROMORPHONE HYDROCHLORIDE 2 MG/ML
INJECTION, SOLUTION INTRAMUSCULAR; INTRAVENOUS; SUBCUTANEOUS AS NEEDED
Status: DISCONTINUED | OUTPATIENT
Start: 2018-10-19 | End: 2018-10-19 | Stop reason: HOSPADM

## 2018-10-19 RX ORDER — DIPHENHYDRAMINE HYDROCHLORIDE 50 MG/ML
12.5 INJECTION, SOLUTION INTRAMUSCULAR; INTRAVENOUS AS NEEDED
Status: DISCONTINUED | OUTPATIENT
Start: 2018-10-19 | End: 2018-10-19 | Stop reason: HOSPADM

## 2018-10-19 RX ORDER — ROCURONIUM BROMIDE 10 MG/ML
INJECTION, SOLUTION INTRAVENOUS AS NEEDED
Status: DISCONTINUED | OUTPATIENT
Start: 2018-10-19 | End: 2018-10-19 | Stop reason: HOSPADM

## 2018-10-19 RX ORDER — FENTANYL CITRATE 50 UG/ML
50 INJECTION, SOLUTION INTRAMUSCULAR; INTRAVENOUS AS NEEDED
Status: DISCONTINUED | OUTPATIENT
Start: 2018-10-19 | End: 2018-10-19 | Stop reason: HOSPADM

## 2018-10-19 RX ORDER — GLYCOPYRROLATE 0.2 MG/ML
INJECTION INTRAMUSCULAR; INTRAVENOUS AS NEEDED
Status: DISCONTINUED | OUTPATIENT
Start: 2018-10-19 | End: 2018-10-19 | Stop reason: HOSPADM

## 2018-10-19 RX ORDER — NEOSTIGMINE METHYLSULFATE 1 MG/ML
INJECTION INTRAVENOUS AS NEEDED
Status: DISCONTINUED | OUTPATIENT
Start: 2018-10-19 | End: 2018-10-19 | Stop reason: HOSPADM

## 2018-10-19 RX ORDER — SODIUM CHLORIDE, SODIUM LACTATE, POTASSIUM CHLORIDE, CALCIUM CHLORIDE 600; 310; 30; 20 MG/100ML; MG/100ML; MG/100ML; MG/100ML
1000 INJECTION, SOLUTION INTRAVENOUS CONTINUOUS
Status: DISCONTINUED | OUTPATIENT
Start: 2018-10-19 | End: 2018-10-19 | Stop reason: HOSPADM

## 2018-10-19 RX ORDER — DEXAMETHASONE SODIUM PHOSPHATE 4 MG/ML
INJECTION, SOLUTION INTRA-ARTICULAR; INTRALESIONAL; INTRAMUSCULAR; INTRAVENOUS; SOFT TISSUE AS NEEDED
Status: DISCONTINUED | OUTPATIENT
Start: 2018-10-19 | End: 2018-10-19 | Stop reason: HOSPADM

## 2018-10-19 RX ORDER — PROPOFOL 10 MG/ML
INJECTION, EMULSION INTRAVENOUS AS NEEDED
Status: DISCONTINUED | OUTPATIENT
Start: 2018-10-19 | End: 2018-10-19 | Stop reason: HOSPADM

## 2018-10-19 RX ORDER — ONDANSETRON 2 MG/ML
4 INJECTION INTRAMUSCULAR; INTRAVENOUS AS NEEDED
Status: DISCONTINUED | OUTPATIENT
Start: 2018-10-19 | End: 2018-10-19 | Stop reason: HOSPADM

## 2018-10-19 RX ORDER — ROPIVACAINE HYDROCHLORIDE 5 MG/ML
30 INJECTION, SOLUTION EPIDURAL; INFILTRATION; PERINEURAL AS NEEDED
Status: DISCONTINUED | OUTPATIENT
Start: 2018-10-19 | End: 2018-10-19 | Stop reason: HOSPADM

## 2018-10-19 RX ORDER — DEXMEDETOMIDINE HYDROCHLORIDE 4 UG/ML
INJECTION, SOLUTION INTRAVENOUS AS NEEDED
Status: DISCONTINUED | OUTPATIENT
Start: 2018-10-19 | End: 2018-10-19 | Stop reason: HOSPADM

## 2018-10-19 RX ORDER — LIDOCAINE HYDROCHLORIDE 20 MG/ML
INJECTION, SOLUTION EPIDURAL; INFILTRATION; INTRACAUDAL; PERINEURAL AS NEEDED
Status: DISCONTINUED | OUTPATIENT
Start: 2018-10-19 | End: 2018-10-19 | Stop reason: HOSPADM

## 2018-10-19 RX ORDER — MIDAZOLAM HYDROCHLORIDE 1 MG/ML
0.5 INJECTION, SOLUTION INTRAMUSCULAR; INTRAVENOUS
Status: DISCONTINUED | OUTPATIENT
Start: 2018-10-19 | End: 2018-10-19 | Stop reason: HOSPADM

## 2018-10-19 RX ORDER — ALBUTEROL SULFATE 0.83 MG/ML
2.5 SOLUTION RESPIRATORY (INHALATION) AS NEEDED
Status: DISCONTINUED | OUTPATIENT
Start: 2018-10-19 | End: 2018-10-19 | Stop reason: HOSPADM

## 2018-10-19 RX ORDER — MORPHINE SULFATE 10 MG/ML
2 INJECTION, SOLUTION INTRAMUSCULAR; INTRAVENOUS
Status: DISCONTINUED | OUTPATIENT
Start: 2018-10-19 | End: 2018-10-19 | Stop reason: HOSPADM

## 2018-10-19 RX ADMIN — LIDOCAINE HYDROCHLORIDE 80 MG: 20 INJECTION, SOLUTION EPIDURAL; INFILTRATION; INTRACAUDAL; PERINEURAL at 07:32

## 2018-10-19 RX ADMIN — SODIUM CHLORIDE, POTASSIUM CHLORIDE, SODIUM LACTATE AND CALCIUM CHLORIDE: 600; 310; 30; 20 INJECTION, SOLUTION INTRAVENOUS at 07:25

## 2018-10-19 RX ADMIN — ONDANSETRON 4 MG: 2 INJECTION INTRAMUSCULAR; INTRAVENOUS at 08:54

## 2018-10-19 RX ADMIN — ROCURONIUM BROMIDE 50 MG: 10 INJECTION, SOLUTION INTRAVENOUS at 07:32

## 2018-10-19 RX ADMIN — Medication 2 G: at 07:45

## 2018-10-19 RX ADMIN — HYDROMORPHONE HYDROCHLORIDE 0.5 MG: 2 INJECTION, SOLUTION INTRAMUSCULAR; INTRAVENOUS; SUBCUTANEOUS at 08:08

## 2018-10-19 RX ADMIN — ONDANSETRON 4 MG: 2 INJECTION INTRAMUSCULAR; INTRAVENOUS at 11:05

## 2018-10-19 RX ADMIN — ACETAMINOPHEN 1000 MG: 10 INJECTION, SOLUTION INTRAVENOUS at 07:50

## 2018-10-19 RX ADMIN — DEXAMETHASONE SODIUM PHOSPHATE 4 MG: 4 INJECTION, SOLUTION INTRA-ARTICULAR; INTRALESIONAL; INTRAMUSCULAR; INTRAVENOUS; SOFT TISSUE at 07:55

## 2018-10-19 RX ADMIN — FENTANYL CITRATE 50 MCG: 50 INJECTION, SOLUTION INTRAMUSCULAR; INTRAVENOUS at 07:32

## 2018-10-19 RX ADMIN — ROCURONIUM BROMIDE 10 MG: 10 INJECTION, SOLUTION INTRAVENOUS at 08:22

## 2018-10-19 RX ADMIN — GLYCOPYRROLATE 0.4 MG: 0.2 INJECTION INTRAMUSCULAR; INTRAVENOUS at 08:52

## 2018-10-19 RX ADMIN — MIDAZOLAM HYDROCHLORIDE 2 MG: 1 INJECTION, SOLUTION INTRAMUSCULAR; INTRAVENOUS at 07:27

## 2018-10-19 RX ADMIN — PROPOFOL 150 MG: 10 INJECTION, EMULSION INTRAVENOUS at 07:32

## 2018-10-19 RX ADMIN — DEXMEDETOMIDINE HYDROCHLORIDE 10 MCG: 4 INJECTION, SOLUTION INTRAVENOUS at 08:14

## 2018-10-19 RX ADMIN — DEXMEDETOMIDINE HYDROCHLORIDE 10 MCG: 4 INJECTION, SOLUTION INTRAVENOUS at 08:22

## 2018-10-19 RX ADMIN — Medication 10 ML: at 11:06

## 2018-10-19 RX ADMIN — HYDROMORPHONE HYDROCHLORIDE 0.5 MG: 2 INJECTION, SOLUTION INTRAMUSCULAR; INTRAVENOUS; SUBCUTANEOUS at 08:14

## 2018-10-19 RX ADMIN — FENTANYL CITRATE 50 MCG: 50 INJECTION, SOLUTION INTRAMUSCULAR; INTRAVENOUS at 07:30

## 2018-10-19 RX ADMIN — NEOSTIGMINE METHYLSULFATE 3 MG: 1 INJECTION INTRAVENOUS at 08:52

## 2018-10-19 RX ADMIN — SODIUM CHLORIDE, POTASSIUM CHLORIDE, SODIUM LACTATE AND CALCIUM CHLORIDE: 600; 310; 30; 20 INJECTION, SOLUTION INTRAVENOUS at 08:55

## 2018-10-19 RX ADMIN — PROPOFOL 50 MG: 10 INJECTION, EMULSION INTRAVENOUS at 08:07

## 2018-10-19 RX ADMIN — GLYCOPYRROLATE 0.2 MG: 0.2 INJECTION INTRAMUSCULAR; INTRAVENOUS at 07:59

## 2018-10-19 NOTE — BRIEF OP NOTE
BRIEF OPERATIVE NOTE Date of Procedure: 10/19/2018 Preoperative Diagnosis: LEFT INGUINAL HERNIA, UMBILICAL HERNIA Postoperative Diagnosis: LEFT INGUINAL HERNIA, UMBILICAL HERNIA Procedure(s): LAPAROSCOPIC LEFT INGUINAL HERNIA REPAIR WITH MESH, OPEN UMBILICAL HERNIA REPAIR Surgeon(s) and Role: 
   Corrinne Ek, MD - Primary Surgical Staff: 
Circ-1: Toma Bonilla RN Scrub Tech-1: Arabella Stewart Surg Asst-1: Juliette Retort Event Time In Time Out Incision Start 4929 Incision Close 0909 Anesthesia: General  
Estimated Blood Loss: 5cc Specimens: * No specimens in log * Findings: left indirect inguinal hernia repaired with a 15x12 Parietex Progrip mesh, small 1cm umbilical hernia closed primarily Complications: none Implants:  
Implant Name Type Inv. Item Serial No.  Lot No. LRB No. Used Action MESH LAPSCP MNFIL CLGN [id number] -- PROGRIP - [id number]  MESH LAPSCP MNFIL CLGN [id number] -- PROGRIP [id number] [facility] SURGICAL [id number] Left 1 Implanted

## 2018-10-19 NOTE — DISCHARGE INSTRUCTIONS
Inguinal Hernia Repair      Patient Discharge Instructions    Brunilda Hand / [id number] : 1960    Admitted 10/19/2018 Discharged: 10/19/2018     · It is important that you take the medication exactly as they are prescribed. · Keep your medication in the bottles provided by the pharmacist and keep a list of the medication names, dosages, and times to be taken in your wallet. · Do not take other medications without consulting your doctor. · Wound care: You may shower starting tomorrow. Do not remove the dermabond on the incision, it will fall of on its own in a few weeks. · No heavy lifting or strenuous activity for 2wks after the operation    Take ibuprofen (Motrin) as scheduled then combine with oxycodone/acetaminophen (Percocet, Roxicet, Tylox) as needed for severe pain. What to do at Home    See instructions below. Follow-up with Dr. Seth Hernandez in 2 week(s). Call the office ([phone]) to schedule your appointment. Information obtained by :  I understand that if any problems occur once I am at home I am to contact my physician. I understand and acknowledge receipt of the instructions indicated above. [de-identified] or R.N.'s Signature                                                                  Date/Time                                                                                                                                              Patient or Representative Signature                                                          Date/Time     Inguinal Hernia Repair: What to Expect at [address] are likely to have pain for the next few days. You may also feel like you have the flu, and you may have a low fever and feel tired and nauseated. This is common.   You should feel better after a few days and will probably feel much better in 7 days.  For several weeks you may feel twinges or pulling in the hernia repair when you move. You may have some bruising on the scrotum and along the penis. This is normal. Men will need to wear a jockstrap or briefs, not boxers, for scrotal support for several days after a groin (inguinal) hernia repair. Spandex bicycle shorts may provide good support. This care sheet gives you a general idea about how long it will take for you to recover. But each person recovers at a different pace. Follow the steps below to get better as quickly as possible. How can you care for yourself at home? Activity  · Rest when you feel tired. Getting enough sleep will help you recover. Sleep with your head up by using three or four pillows. You can also try to sleep with your head up in a recliner chair. Do not sleep on your side or stomach. · Try to walk each day. Start by walking a little more than you did the day before. Bit by bit, increase the amount you walk. Walking boosts blood flow and helps prevent pneumonia and constipation. · Put ice or a cold pack on the area of your hernia repair for 10 to 20 minutes at a time. Try to do this every 1 to 2 hours for the first 24 hours (when you are awake) or until the swelling goes down. Put a thin cloth between the ice and your skin. · Avoid strenuous activities, such as biking, jogging, weight lifting, or aerobic exercise, until your doctor says it is okay. · Avoid lifting anything that would make you strain. This may include heavy grocery bags and milk containers, a heavy briefcase or backpack, cat litter or dog food bags, a vacuum , or a child. · You may drive when you are no longer taking pain medicine and can quickly move your foot from the gas pedal to the brake. You must also be able to sit comfortably for a long period of time, even if you do not plan to go far. You might get caught in traffic.   · Most people are able to return to work within 1 to 2 weeks after surgery. · You may shower 24 to 48 hours after surgery, if your doctor okays it. Pat the cut (incision) dry. Do not take a bath for the first 2 weeks, or until your doctor tells you it is okay. · Your doctor will tell you when you can have sex again. Diet  · You can eat your normal diet. If your stomach is upset, try bland, low-fat foods like plain rice, broiled chicken, toast, and yogurt. · Drink plenty of fluids (unless your doctor tells you not to). · You may notice that your bowel movements are not regular right after your surgery. This is common. Avoid constipation and straining with bowel movements. You may want to take a fiber supplement every day. If you have not had a bowel movement after a couple of days, ask your doctor about taking a mild laxative. Medicines  · Take pain medicines exactly as directed. ¨ If the doctor gave you a prescription medicine for pain, take it as prescribed. ¨ If you are not taking a prescription pain medicine, take an over-the-counter medicine such as acetaminophen (Tylenol), ibuprofen (Advil, Motrin), or naproxen (Aleve). Read and follow all instructions on the label. ¨ Do not take two or more pain medicines at the same time unless the doctor told you to. Many pain medicines have acetaminophen, which is Tylenol. Too much acetaminophen (Tylenol) can be harmful. · If your doctor prescribed antibiotics, take them as directed. Do not stop taking them just because you feel better. You need to take the full course of antibiotics. · If you think your pain medicine is making you sick to your stomach:  ¨ Take your medicine after meals (unless your doctor has told you not to). ¨ Ask your doctor for a different pain medicine. Incision care  · Wash the area daily with warm, soapy water and pat it dry. Follow-up care is a key part of your treatment and safety. Be sure to make and go to all appointments, and call your doctor if you are having problems.  It's also a good idea to know your test results and keep a list of the medicines you take. When should you call for help? Call 911 anytime you think you may need emergency care. For example, call if:  · You passed out (lost consciousness). · You have sudden chest pain and shortness of breath, or you cough up blood. · You have severe pain in your belly. Call your doctor now or seek immediate medical care if:  · You are sick to your stomach and cannot keep fluids down. · You have signs of a blood clot, such as:  ¨ Pain in your calf, back of the knee, thigh, or groin. ¨ Redness and swelling in your leg or groin. · You have trouble passing urine or stool, especially if you have mild pain or swelling in your lower belly. · Bright red blood has soaked through the bandage over your incision. Watch closely for any changes in your health, and be sure to contact your doctor if:  · Your swelling is getting worse. · Your swelling is not going down. Where can you learn more? Go to [employer]  Enter M141 in the search box to learn more about \"Hernia Repair: What to Expect at Home. \"   © 1513-7928 Healthwise, Incorporated. Care instructions adapted under license by [employer] (which disclaims liability or warranty for this information). This care instruction is for use with your licensed healthcare professional. If you have questions about a medical condition or this instruction, always ask your healthcare professional. [place] [address] any warranty or liability for your use of this information. Content Version: 9.9.83608;  Last Revised: January 21, 2011    ______________________________________________________________________    Anesthesia Discharge Instructions    After general anesthesia or intervenous sedation, for 24 hours or while taking prescription Narcotics:  · Limit your activities  · Do not drive or operate hazardous machinery  · If you have not urinated within 8 hours after discharge, please contact your surgeon on call. · Do not make important personal or business decisions  · Do not drink alcoholic beverages    Report the following to your surgeon:  · Excessive pain, swelling, redness or odor of or around the surgical area  · Temperature over 100.5 degrees  · Nausea and vomiting lasting longer than 4 hours or if unable to take medication  · Any signs of decreased circulation or nerve impairment to extremity:  Change in color, persistent numbness, tingling, coldness or increased pain.   · Any questions

## 2018-10-19 NOTE — ANESTHESIA PREPROCEDURE EVALUATION
Anesthetic History No history of anesthetic complications Review of Systems / Medical History Patient summary reviewed, nursing notes reviewed and pertinent labs reviewed Pulmonary Within defined limits Neuro/Psych Within defined limits Cardiovascular Hypertension GI/Hepatic/Renal 
Within defined limits Endo/Other Diabetes Arthritis Other Findings Physical Exam 
 
Airway Mallampati: II 
TM Distance: > 6 cm Neck ROM: normal range of motion Mouth opening: Normal 
 
 Cardiovascular Regular rate and rhythm,  S1 and S2 normal,  no murmur, click, rub, or gallop Dental 
 
Dentition: Upper dentition intact and Lower dentition intact Pulmonary Breath sounds clear to auscultation Abdominal 
GI exam deferred Other Findings Anesthetic Plan ASA: 2 Anesthesia type: general 
 
 
 
 
Induction: Intravenous Anesthetic plan and risks discussed with: Patient

## 2018-10-19 NOTE — OP NOTES
[address]   OPERATIVE REPORT    Irma Franz  MR#: [id number]  : 1960  ACCOUNT #: [de-identified]   DATE OF SERVICE: 10/19/2018    PREOPERATIVE DIAGNOSIS:  Left inguinal hernia and umbilical hernia. POSTOPERATIVE DIAGNOSIS:  Left inguinal hernia and umbilical hernia. PROCEDURE PERFORMED:  Laparoscopic left inguinal hernia repair with mesh and open umbilical hernia repair. SURGEON:  Mario Wayne MD    ASSISTANT:  Yulisa Presley    INDICATION FOR THE OPERATION:  The patient is a 24-year-old male who has a symptomatic left inguinal hernia that is needing repair with mesh in the operating room. He also has an umbilical hernia that appears to be small. He will have that repaired open. DESCRIPTION OF OPERATION:  The patient was met in the preop holding area, the H and P was updated. Consent was signed. All risks and benefits were explained to the patient prior to the start of the operation. He was taken back to the operating room. He was lying in the supine position. The abdomen was prepped and draped in standard sterile fashion. Timeout was called. Antibiotics were given. SCDs on lower extremities. We started the operation by making an infraumbilical curvilinear incision, dissecting down to the umbilical stalk. We dissected around the umbilical stalk a little further with blunt dissection with a Minnie clamp. We lifted the off of the infraumbilical fascia disconnecting it from the abdominal wall and exposing our umbilical hernia. Our umbilical hernia was about 1 cm in size. There were no intraperitoneal contents coming through. We then placed a 5 mm trocar into the intra-abdominal cavity, insufflated to 15 mmHg. We placed a 5 mm right-sided trocar, a 5 mm left-sided trocar and switched out to a 12 mm periumbilical trocar. We then looked down into the left lower quadrant finding a left inguinal hernia present.   We looked on the right side and there was no hernia on the right side. We were able to reduce the colon, which had herniated into the left inguinal hernia. We were able to reduce that laparoscopically. Once the contents were reduced, we then started our dissection, dissecting into the preperitoneal plane going from medial to lateral in the left lower quadrant. We dissected down to the pubic bone to the hernia sac to the lateral abdominal wall. We then dissected the hernia sac free from the indirect inguinal space dissecting free the testicular vessels and the vas deferens, which were all identified and preserved. We widened our dissection a little bit further to accommodate our mesh. We then placed a 15 x 12 cm Parietex ProGrip mesh, placing it over the hernia defect centering it over the defect. We tacked it down to the pubic bone inferiorly, to the anterior abdominal wall superiorly, laterally as well. We then tacked the peritoneum back up to the anterior abdominal wall with the tacking device. There were no holes in the peritoneum. The mesh was completely covered by the peritoneum. We then desufflated the abdominal cavity, removed our trocars and then closed the umbilical hernia fascial defect with multiple interrupted 0 Ethibond sutures in an interrupted fashion and then tacked the umbilicus down to the umbilical stalk with a 2-0 Vicryl suture, closed the deep dermal layer with multiple interrupted 3-0 Vicryl sutures and closed the skin with a 4-0 Monocryl and Dermabond with a pressure dressing over top of the wound as well. We had completed the operation. Dr. Radha Garnica was present and scrubbed during the entire operation. The counts were correct. ANESTHESIA:  General.    ESTIMATED BLOOD LOSS:  5 mL. SPECIMENS REMOVED:  None. FINDINGS:  Left indirect inguinal hernia repaired with a 15 x 12 cm Parietex ProGrip mesh. Small 1 cm umbilical hernia closed primarily. COMPLICATIONS:  None.     IMPLANTS:  15 x 12 cm Parietex ProGrip MD FRANSISCO Vega / [place]  D: 10/19/2018 09:40     T: 10/19/2018 16:08  JOB #: [id number]

## 2018-10-19 NOTE — INTERVAL H&P NOTE
H&P Update: 
Tobias Hale was seen and examined. History and physical has been reviewed. The patient has been examined. There have been no significant clinical changes since the completion of the originally dated History and Physical. 
Patient identified by surgeon; surgical site was confirmed by patient and surgeon.  
 
Signed By: Liz Dubois MD   
 October 19, 2018 7:21 AM

## 2018-10-19 NOTE — ANESTHESIA POSTPROCEDURE EVALUATION
Post-Anesthesia Evaluation and Assessment Patient: Matthew Foreman MRN: [id number]  SSN: [id number] YOB: 1960  Age: 59 y.o. Sex: male Cardiovascular Function/Vital Signs Visit Vitals /87 (BP 1 Location: Left arm, BP Patient Position: At rest) Pulse 68 Temp 36.4 °C (97.6 °F) Resp 9 Ht 5' 10\" (1.778 m) Wt 87.5 kg (193 lb) SpO2 95% BMI 27.69 kg/m² Patient is status post General anesthesia for Procedure(s): LAPAROSCOPIC LEFT INGUINAL HERNIA REPAIR WITH MESH, OPEN UMBILICAL HERNIA REPAIR. Nausea/Vomiting: None Postoperative hydration reviewed and adequate. Pain: 
Pain Scale 1: Numeric (0 - 10) (10/19/18 1000) Pain Intensity 1: 0 (10/19/18 1000) Managed Neurological Status:  
Neuro (WDL): Within Defined Limits (10/19/18 1000) Neuro Neurologic State: Alert (10/19/18 1000) LUE Motor Response: Spontaneous  (10/19/18 1000) LLE Motor Response: Spontaneous  (10/19/18 1000) RUE Motor Response: Spontaneous  (10/19/18 1000) RLE Motor Response: Spontaneous  (10/19/18 1000) At baseline Mental Status, Level of Consciousness: Alert and  oriented to person, place, and time Pulmonary Status:  
O2 Device: Room air (10/19/18 1000) Adequate oxygenation and airway patent Complications related to anesthesia: None Post-anesthesia assessment completed. No concerns Signed By: Leonora Jones MD   
 October 19, 2018 Procedure(s): LAPAROSCOPIC LEFT INGUINAL HERNIA REPAIR WITH MESH, OPEN UMBILICAL HERNIA REPAIR. 
 
<BSHSIANPOST> Visit Vitals /87 (BP 1 Location: Left arm, BP Patient Position: At rest) Pulse 68 Temp 36.4 °C (97.6 °F) Resp 9 Ht 5' 10\" (1.778 m) Wt 87.5 kg (193 lb) SpO2 95% BMI 27.69 kg/m²

## 2018-11-05 ENCOUNTER — OFFICE VISIT (OUTPATIENT)
Dept: SURGERY | Age: 58
End: 2018-11-05

## 2018-11-05 VITALS
OXYGEN SATURATION: 98 % | WEIGHT: 195 LBS | BODY MASS INDEX: 27.92 KG/M2 | HEIGHT: 70 IN | SYSTOLIC BLOOD PRESSURE: 120 MMHG | DIASTOLIC BLOOD PRESSURE: 80 MMHG | TEMPERATURE: 98.2 F | HEART RATE: 76 BPM | RESPIRATION RATE: 18 BRPM

## 2018-11-05 DIAGNOSIS — Z09 POSTOPERATIVE EXAMINATION: Primary | ICD-10-CM

## 2018-11-05 NOTE — PATIENT INSTRUCTIONS
As of today, you may lift up to 15 lbs for another week and then increase to 25 lbs the following week. On the third week from now, you may lift 35-40 lbs as tolerated. Thereafter, gradually increase weight limit as tolerated. If at any time any lifting or activity causes you pain, please avoid it until pain improves. If you had an inguinal hernia repair, do not perform any biking for 6 weeks after your surgery. Abdominal Hernia Repair: What to Expect at Home  Your Recovery  After surgery to repair your hernia, you are likely to have pain for a few days. You may also feel like you have the flu, and you may have a low fever and feel tired and nauseated. This is common. You should feel better after a few days and will probably feel much better in 7 days. For several weeks you may feel twinges or pulling in the hernia repair when you move. You may have some bruising around the area of your hernia repair. This is normal.  This care sheet gives you a general idea about how long it will take for you to recover. But each person recovers at a different pace. Follow the steps below to get better as quickly as possible. How can you care for yourself at home? Activity    · Rest when you feel tired. Getting enough sleep will help you recover.     · Try to walk each day. Start by walking a little more than you did the day before. Bit by bit, increase the amount you walk. Walking boosts blood flow and helps prevent pneumonia and constipation.     · If your doctor gives you an abdominal binder to wear, use it as directed. This is an elastic bandage that wraps around your belly and upper hips. It helps support your belly muscles after surgery.     · Avoid strenuous activities, such as biking, jogging, weight lifting, or aerobic exercise, until your doctor says it is okay.     · Avoid lifting anything that would make you strain.  This may include heavy grocery bags and milk containers, a heavy briefcase or backpack, cat litter or dog food bags, a vacuum , or a child.     · Ask your doctor when you can drive again.     · Most people are able to return to work within 1 to 2 weeks after surgery. But if your job requires that you to do heavy lifting or strenuous activity, you may need to take 4 to 6 weeks off from work.     · You may shower 24 to 48 hours after surgery, if your doctor okays it. Pat the cut (incision) dry. Do not take a bath for the first 2 weeks, or until your doctor tells you it is okay.     · Ask your doctor when it is okay for you to have sex. Diet    · You can eat your normal diet. If your stomach is upset, try bland, low-fat foods like plain rice, broiled chicken, toast, and yogurt.     · Drink plenty of fluids (unless your doctor tells you not to).     · You may notice that your bowel movements are not regular right after your surgery. This is common. Avoid constipation and straining with bowel movements. You may want to take a fiber supplement every day. If you have not had a bowel movement after a couple of days, ask your doctor about taking a mild laxative. Medicines    · Your doctor will tell you if and when you can restart your medicines. He or she will also give you instructions about taking any new medicines.     · If you take blood thinners, such as warfarin (Coumadin), clopidogrel (Plavix), or aspirin, be sure to talk to your doctor. He or she will tell you if and when to start taking those medicines again. Make sure that you understand exactly what your doctor wants you to do.     · Be safe with medicines. Take pain medicines exactly as directed. ? If the doctor gave you a prescription medicine for pain, take it as prescribed. ? If you are not taking a prescription pain medicine, ask your doctor if you can take an over-the-counter medicine.     · If your doctor prescribed antibiotics, take them as directed. Do not stop taking them just because you feel better.  You need to take the full course of antibiotics.     · If you think your pain medicine is making you sick to your stomach:  ? Take your medicine after meals (unless your doctor has told you not to). ? Ask your doctor for a different pain medicine. Incision care    · If you have strips of tape on the cut (incision) the doctor made, leave the tape on for a week or until it falls off. Or follow your doctor's instructions for removing the tape.     · If you have staples closing the cut, you will need to visit your doctor in 1 to 2 weeks to have them removed.     · Wash the area daily with warm, soapy water, and pat it dry. Don't use hydrogen peroxide or alcohol, which can slow healing. You may cover the area with a gauze bandage if it weeps or rubs against clothing. Change the bandage every day. Other instructions    · Hold a pillow over your incision when you cough or take deep breaths. This will support your belly and decrease your pain.     · Do breathing exercises at home as instructed by your doctor. This will help prevent pneumonia.     · If you had laparoscopic surgery, you may also have pain in your left shoulder. The pain usually lasts about a day or two. Follow-up care is a key part of your treatment and safety. Be sure to make and go to all appointments, and call your doctor if you are having problems. It's also a good idea to know your test results and keep a list of the medicines you take. When should you call for help? Call 911 anytime you think you may need emergency care. For example, call if:    · You passed out (lost consciousness).     · You are short of breath.    Call your doctor now or seek immediate medical care if:    · You are sick to your stomach and cannot drink fluids.     · You have signs of a blood clot in your leg (called a deep vein thrombosis), such as:  ? Pain in your calf, back of the knee, thigh, or groin. ?  Redness and swelling in your leg or groin.     · You have signs of infection, such as:  ? Increased pain, swelling, warmth, or redness. ? Red streaks leading from the incision. ? Pus draining from the incision. ? A fever.     · You cannot pass stools or gas.     · You have pain that does not get better after you take pain medicine.     · You have loose stitches, or your incision comes open.     · Bright red blood has soaked through the bandage over your incision.    Watch closely for changes in your health, and be sure to contact your doctor if you have any problems. Where can you learn more? Go to http://chucho-fly.info/. Enter B577 in the search box to learn more about \"Abdominal Hernia Repair: What to Expect at Home. \"  Current as of: March 28, 2018  Content Version: 11.8  © 0394-5734 [employer], [employer]. Care instructions adapted under license by [employer] (which disclaims liability or warranty for this information). If you have questions about a medical condition or this instruction, always ask your healthcare professional. [address] any warranty or liability for your use of this information.

## 2018-11-05 NOTE — PROGRESS NOTES
Subjective:      Orquidea Carrillo is a 59 y.o. male presents for postop care 17 days following Laparoscopic left inguinal hernia repair with mesh and open umbilical hernia repair by Dr. Montserrat Brown  . Appetite is good. Eating a regular diet without difficulty. Bowel movements are regular. The patient is voiding without difficulty. Pain is controlled without any medications. .    Advance directive not on file    Objective:     Visit Vitals  /80   Pulse 76   Temp 98.2 °F (36.8 °C) (Oral)   Resp 18   Ht 5' 10\" (1.778 m)   Wt 195 lb (88.5 kg)   SpO2 98%   BMI 27.98 kg/m²         General:  alert, cooperative, no distress, appears stated age   Abdomen: soft, bowel sounds active, non-tender   Incision:   healing well, no drainage, no erythema, no hernia, no seroma, no swelling, no dehiscence, incision well approximated     Assessment:     Doing well postoperatively. Plan:     1. Continue any current medications. 2. Wound care discussed. 3. Pt is to increase activities as tolerated. May lift 15 lbs starting today x 1 week, then increase to 25 lbs x 1 week and increase slowly thereafter. Eleazar Loco

## 2018-11-05 NOTE — PROGRESS NOTES
1. Have you been to the ER, urgent care clinic since your last visit? Hospitalized since your last visit? No    2. Have you seen or consulted any other health care providers outside of the [address] [place] [address] since your last visit? Include any pap smears or colon screening.  No

## 2018-11-28 ENCOUNTER — ANESTHESIA EVENT (OUTPATIENT)
Dept: ENDOSCOPY | Age: 58
End: 2018-11-28
Payer: COMMERCIAL

## 2018-11-28 ENCOUNTER — ANESTHESIA (OUTPATIENT)
Dept: ENDOSCOPY | Age: 58
End: 2018-11-28
Payer: COMMERCIAL

## 2018-11-28 ENCOUNTER — HOSPITAL ENCOUNTER (OUTPATIENT)
Age: 58
Setting detail: OUTPATIENT SURGERY
Discharge: HOME | End: 2018-11-28
Attending: COLON & RECTAL SURGERY | Admitting: COLON & RECTAL SURGERY
Payer: COMMERCIAL

## 2018-11-28 VITALS
DIASTOLIC BLOOD PRESSURE: 87 MMHG | OXYGEN SATURATION: 94 % | BODY MASS INDEX: 27.7 KG/M2 | WEIGHT: 193.5 LBS | HEIGHT: 70 IN | TEMPERATURE: 97.7 F | SYSTOLIC BLOOD PRESSURE: 139 MMHG | HEART RATE: 69 BPM | RESPIRATION RATE: 14 BRPM

## 2018-11-28 PROCEDURE — 74011250636 HC RX REV CODE- 250/636: Performed by: COLON & RECTAL SURGERY

## 2018-11-28 PROCEDURE — 77030013992 HC SNR POLYP ENDOSC BSC -B: Performed by: COLON & RECTAL SURGERY

## 2018-11-28 PROCEDURE — 76040000007: Performed by: COLON & RECTAL SURGERY

## 2018-11-28 PROCEDURE — 74011250636 HC RX REV CODE- 250/636

## 2018-11-28 PROCEDURE — 76060000032 HC ANESTHESIA 0.5 TO 1 HR: Performed by: COLON & RECTAL SURGERY

## 2018-11-28 PROCEDURE — 88305 TISSUE EXAM BY PATHOLOGIST: CPT

## 2018-11-28 PROCEDURE — 77030027957 HC TBNG IRR ENDOGTR BUSS -B: Performed by: COLON & RECTAL SURGERY

## 2018-11-28 PROCEDURE — 74011250637 HC RX REV CODE- 250/637: Performed by: COLON & RECTAL SURGERY

## 2018-11-28 RX ORDER — ATROPINE SULFATE 0.1 MG/ML
0.5 INJECTION INTRAVENOUS
Status: DISCONTINUED | OUTPATIENT
Start: 2018-11-28 | End: 2018-11-28 | Stop reason: HOSPADM

## 2018-11-28 RX ORDER — DEXTROMETHORPHAN/PSEUDOEPHED 2.5-7.5/.8
1.2 DROPS ORAL
Status: DISCONTINUED | OUTPATIENT
Start: 2018-11-28 | End: 2018-11-28 | Stop reason: HOSPADM

## 2018-11-28 RX ORDER — FLUMAZENIL 0.1 MG/ML
0.2 INJECTION INTRAVENOUS
Status: DISCONTINUED | OUTPATIENT
Start: 2018-11-28 | End: 2018-11-28 | Stop reason: HOSPADM

## 2018-11-28 RX ORDER — SODIUM CHLORIDE 9 MG/ML
100 INJECTION, SOLUTION INTRAVENOUS CONTINUOUS
Status: DISCONTINUED | OUTPATIENT
Start: 2018-11-28 | End: 2018-11-28 | Stop reason: HOSPADM

## 2018-11-28 RX ORDER — SODIUM CHLORIDE 0.9 % (FLUSH) 0.9 %
5-10 SYRINGE (ML) INJECTION EVERY 8 HOURS
Status: DISCONTINUED | OUTPATIENT
Start: 2018-11-28 | End: 2018-11-28 | Stop reason: HOSPADM

## 2018-11-28 RX ORDER — PROPOFOL 10 MG/ML
INJECTION, EMULSION INTRAVENOUS AS NEEDED
Status: DISCONTINUED | OUTPATIENT
Start: 2018-11-28 | End: 2018-11-28 | Stop reason: HOSPADM

## 2018-11-28 RX ORDER — SODIUM CHLORIDE 9 MG/ML
INJECTION, SOLUTION INTRAVENOUS
Status: DISCONTINUED | OUTPATIENT
Start: 2018-11-28 | End: 2018-11-28 | Stop reason: HOSPADM

## 2018-11-28 RX ORDER — NALOXONE HYDROCHLORIDE 0.4 MG/ML
0.4 INJECTION, SOLUTION INTRAMUSCULAR; INTRAVENOUS; SUBCUTANEOUS
Status: DISCONTINUED | OUTPATIENT
Start: 2018-11-28 | End: 2018-11-28 | Stop reason: HOSPADM

## 2018-11-28 RX ORDER — EPINEPHRINE 0.1 MG/ML
1 INJECTION INTRACARDIAC; INTRAVENOUS
Status: DISCONTINUED | OUTPATIENT
Start: 2018-11-28 | End: 2018-11-28 | Stop reason: HOSPADM

## 2018-11-28 RX ORDER — FENTANYL CITRATE 50 UG/ML
12.5-5 INJECTION, SOLUTION INTRAMUSCULAR; INTRAVENOUS
Status: DISCONTINUED | OUTPATIENT
Start: 2018-11-28 | End: 2018-11-28 | Stop reason: HOSPADM

## 2018-11-28 RX ORDER — MIDAZOLAM HYDROCHLORIDE 1 MG/ML
.25-5 INJECTION, SOLUTION INTRAMUSCULAR; INTRAVENOUS
Status: DISCONTINUED | OUTPATIENT
Start: 2018-11-28 | End: 2018-11-28 | Stop reason: HOSPADM

## 2018-11-28 RX ORDER — LIDOCAINE HYDROCHLORIDE 20 MG/ML
INJECTION, SOLUTION EPIDURAL; INFILTRATION; INTRACAUDAL; PERINEURAL AS NEEDED
Status: DISCONTINUED | OUTPATIENT
Start: 2018-11-28 | End: 2018-11-28 | Stop reason: HOSPADM

## 2018-11-28 RX ORDER — SODIUM CHLORIDE 0.9 % (FLUSH) 0.9 %
5-10 SYRINGE (ML) INJECTION AS NEEDED
Status: DISCONTINUED | OUTPATIENT
Start: 2018-11-28 | End: 2018-11-28 | Stop reason: HOSPADM

## 2018-11-28 RX ADMIN — PROPOFOL 25 MG: 10 INJECTION, EMULSION INTRAVENOUS at 10:06

## 2018-11-28 RX ADMIN — LIDOCAINE HYDROCHLORIDE 40 MG: 20 INJECTION, SOLUTION EPIDURAL; INFILTRATION; INTRACAUDAL; PERINEURAL at 09:54

## 2018-11-28 RX ADMIN — PROPOFOL 25 MG: 10 INJECTION, EMULSION INTRAVENOUS at 10:13

## 2018-11-28 RX ADMIN — PROPOFOL 25 MG: 10 INJECTION, EMULSION INTRAVENOUS at 09:59

## 2018-11-28 RX ADMIN — PROPOFOL 50 MG: 10 INJECTION, EMULSION INTRAVENOUS at 10:16

## 2018-11-28 RX ADMIN — PROPOFOL 25 MG: 10 INJECTION, EMULSION INTRAVENOUS at 09:57

## 2018-11-28 RX ADMIN — PROPOFOL 25 MG: 10 INJECTION, EMULSION INTRAVENOUS at 10:01

## 2018-11-28 RX ADMIN — PROPOFOL 25 MG: 10 INJECTION, EMULSION INTRAVENOUS at 10:09

## 2018-11-28 RX ADMIN — PROPOFOL 50 MG: 10 INJECTION, EMULSION INTRAVENOUS at 09:56

## 2018-11-28 RX ADMIN — PROPOFOL 50 MG: 10 INJECTION, EMULSION INTRAVENOUS at 09:54

## 2018-11-28 RX ADMIN — PROPOFOL 25 MG: 10 INJECTION, EMULSION INTRAVENOUS at 10:03

## 2018-11-28 RX ADMIN — PROPOFOL 25 MG: 10 INJECTION, EMULSION INTRAVENOUS at 10:19

## 2018-11-28 RX ADMIN — SODIUM CHLORIDE: 9 INJECTION, SOLUTION INTRAVENOUS at 09:45

## 2018-11-28 NOTE — ANESTHESIA PREPROCEDURE EVALUATION
Anesthetic History No history of anesthetic complications Review of Systems / Medical History Patient summary reviewed, nursing notes reviewed and pertinent labs reviewed Pulmonary Within defined limits Neuro/Psych Within defined limits Cardiovascular Hypertension GI/Hepatic/Renal 
Within defined limits Endo/Other Diabetes Arthritis Other Findings Physical Exam 
 
Airway Mallampati: II 
TM Distance: > 6 cm Neck ROM: normal range of motion Mouth opening: Normal 
 
 Cardiovascular Regular rate and rhythm,  S1 and S2 normal,  no murmur, click, rub, or gallop Dental 
 
Dentition: Upper dentition intact and Lower dentition intact Pulmonary Breath sounds clear to auscultation Abdominal 
GI exam deferred Other Findings Anesthetic Plan ASA: 2 Anesthesia type: MAC Induction: Intravenous Anesthetic plan and risks discussed with: Patient

## 2018-11-28 NOTE — H&P
History and Physical (outpatient) Patient: Ramon Gore 59 y.o. male Referring Physician:  No ref. provider found Chief Complaint:  Need for colorectal cancer screening. History of Present Illness: The patient is an asymptomatic 53-year-old male whose last colonoscopy was performed on 4/11/2008. That procedure was performed to evaluate rectal bleeding, and it revealed somewhat enlarged external hemorrhoids and a superficial posterior anal fissure that was located just off the midline. There were no masses, neoplasms or diverticula identified. History: 
Past Medical History:  
Diagnosis Date  Arthritis  Diabetes ([address]  Family history of skin cancer  History of kidney stones  Hypertension  Ill-defined condition   
 intentional wt loss of 65 pounds over 2 yrs  Skin cancer   
 squamous cell skin cancer  Sun-damaged skin  Sunburn, blistering Past Surgical History:  
Procedure Laterality Date  HX COLONOSCOPY  04/11/2008 No neoplasms. Dr. Radha Schaeffer.  HX HERNIA REPAIR Left 10/19/2018 Left inguinal and umbilical hernia repairs with mesh; Dr. Deborah Donahue.  HX LITHOTRIPSY  HX MOHS PROCEDURES  06/26/2017 SCC L superior helical rim by Dr. Alverna Rinne Family History Problem Relation Age of Onset  Cancer Mother   
     ovarian  Diabetes Mother  Diabetes Father  Cancer Father   
     bladder cancer  Arrhythmia Father   
     afib  COPD Brother  Anesth Problems Neg Hx Social History Socioeconomic History  Marital status:  Spouse name: Not on file  Number of children: Not on file  Years of education: Not on file  Highest education level: Not on file Social Needs  Financial resource strain: Not on file  Food insecurity - worry: Not on file  Food insecurity - inability: Not on file  Transportation needs - medical: Not on file  Transportation needs - non-medical: Not on file Occupational History  Not on file Tobacco Use  Smoking status: Never Smoker  Smokeless tobacco: Never Used Substance and Sexual Activity  Alcohol use: Yes Comment: occasional   
 Drug use: No  
 Sexual activity: Not on file Other Topics Concern  Not on file Social History Narrative  Not on file Allergies: Allergies Allergen Reactions  Other Food Nausea and Vomiting Green peppers  Shellfish Derived Nausea and Vomiting Current Medications: 
Cannot display prior to admission medications because the patient has not been admitted in this contact. Current Facility-Administered Medications Medication Dose Route Frequency  sodium chloride (NS) flush 5-10 mL  5-10 mL IntraVENous Q8H  
 sodium chloride (NS) flush 5-10 mL  5-10 mL IntraVENous PRN  
 naloxone (NARCAN) injection 0.4 mg  0.4 mg IntraVENous Multiple  flumazenil (ROMAZICON) 0.1 mg/mL injection 0.2 mg  0.2 mg IntraVENous Multiple  simethicone (MYLICON) 99GC/4.2OP oral drops 80 mg  1.2 mL Oral Multiple  atropine injection 0.5 mg  0.5 mg IntraVENous ONCE PRN  
 EPINEPHrine (ADRENALIN) 0.1 mg/mL syringe 1 mg  1 mg Endoscopically ONCE PRN  
 0.9% sodium chloride infusion  100 mL/hr IntraVENous CONTINUOUS  
 fentaNYL citrate (PF) injection 12.5-50 mcg  12.5-50 mcg IntraVENous Multiple  midazolam (VERSED) injection 0.25-5 mg  0.25-5 mg IntraVENous Multiple Facility-Administered Medications Ordered in Other Encounters Medication Dose Route Frequency  0.9% sodium chloride infusion   IntraVENous CONTINUOUS  propofol (DIPRIVAN) 10 mg/mL injection   IntraVENous PRN  
 lidocaine (PF) (XYLOCAINE) 20 mg/mL (2 %) injection   IntraVENous PRN Physical Exam: 
Blood pressure 163/89, pulse 71, temperature 98.2 °F (36.8 °C), resp. rate 13, height 5' 10\" (1.778 m), weight 87.8 kg (193 lb 8 oz), SpO2 98 %. GENERAL:  No apparent distress. LUNGS:  Clear to auscultation bilaterally. HEART:  Regular rate and rhythm with no murmurs, gallops, or rubs. ABDOMEN: Soft, non-tender, and non-distended. Healing umbilical incision. NEUROLOGIC: Alert and oriented. No gross deficits. Alerts:   
Hospital Problems  Date Reviewed: 11/5/2018 None Laboratory:    No results for input(s): HGB, HCT, WBC, PLT, INR, BUN, CREA, K, CRCLT, HGBEXT, HCTEXT, PLTEXT, HGBEXT, HCTEXT, PLTEXT in the last 72 hours. No lab exists for component: PTT, PT, INREXT, INREXT Assessment:   Another colonoscopy is indicated for colorectal cancer screening. Plan:  The risks, benefits, and alternatives were reviewed with the patient, and he has agreed to proceed with the procedure. The plan for this patient includes MAC.

## 2018-11-28 NOTE — PROGRESS NOTES
[facility] 1960 
[id number] Situation: 
Verbal report received from: YASH Limon rn 
Procedure: Procedure(s): 
COLONOSCOPY 
ENDOSCOPIC POLYPECTOMY Background: 
 
Preoperative diagnosis: SCREENING Postoperative diagnosis: ano canal polyp :  Dr. Reema Christine Assistant(s): Endoscopy Technician-1: Rigo Haile IV 
Endoscopy RN-1: Maryann Humphrey RN Specimens:  
ID Type Source Tests Collected by Time Destination 1 : pathology Preservative Rectum  Sarwat Rowe MD 11/28/2018 1020 Pathology H. Pylori  no Assessment: 
Intra-procedure medications Anesthesia gave intra-procedure sedation and medications, see anesthesia flow sheet yes Intravenous fluids: NS@ Cem Paty Vital signs stable Abdominal assessment: round and soft Recommendation: 
Discharge patient per MD order. Family Permission to share finding with family or friend yes Endoscopy discharge instructions have been reviewed and given to patient. The patient verbalized understanding and acceptance of instructions.

## 2018-11-28 NOTE — DISCHARGE INSTRUCTIONS
Raina Maldonado, [address] [facility],[place] [address]., [address] [facility], [facility] [facility]  [phone]      Post-Polypectomy Instructions    1. Do not drive, operate dangerous machinery, sign legal documents, or conduct any important business for the rest of the day. 2. Avoid strenuous exercise for the next 10 days. 3. Avoid straining when you move your bowels. 4. Do not take any aspirin, aspirin-containing products, ibuprofen (Advil, Motrin, etc.), or Aleve for the next two weeks. You may take Tylenol as directed on the bottle if needed. 5. You may begin with a light diet today then advance to your usual diet as tolerated. Do not drink alcohol for the rest of the day. 6. If you notice blood in your stool for more than one or two bowel movements following the procedure, if you develop abdominal pain (aside from gas cramps on the day of the procedure), or if you develop a fever with a temperature of 101.0 or higher, call my office. 7. If you do not have a bowel movement within the next two days, call my office. 8. If you have any other problems or questions, please call my office. 9. Findings and Follow-up:  There was no cancer. There was one small, bening-appearing polypoid growth in the lower rectum/upper anal canal that we removed. Because of the location, you might have some soreness there for a few days, but it should not be anything that would require medication other than Tylenol. You might also see a small amount of blood with your next few bowel movements. I will discuss the pathology results with you when they are available, and I will make a follow-up recommendation at that time. Please call my office if you have not heard from me by Monday 12/3/2018.

## 2018-11-28 NOTE — ANESTHESIA POSTPROCEDURE EVALUATION
Post-Anesthesia Evaluation and Assessment Patient: Faith Stuart MRN: [id number]  SSN: [id number] YOB: 1960  Age: 59 y.o. Sex: male I have evaluated the patient and they are stable and ready for discharge from the PACU. Cardiovascular Function/Vital Signs Visit Vitals BP (!) 148/94 Pulse 71 Temp 36.5 °C (97.7 °F) Resp 11 Ht 5' 10\" (1.778 m) Wt 87.8 kg (193 lb 8 oz) SpO2 96% BMI 27.76 kg/m² Patient is status post MAC anesthesia for Procedure(s): 
COLONOSCOPY 
ENDOSCOPIC POLYPECTOMY. Nausea/Vomiting: None Postoperative hydration reviewed and adequate. Pain: 
Pain Scale 1: Numeric (0 - 10) (11/28/18 1045) Pain Intensity 1: 1 (11/28/18 1045) Managed Neurological Status: At baseline Mental Status, Level of Consciousness: Alert and  oriented to person, place, and time Pulmonary Status:  
O2 Device: Room air (11/28/18 1045) Adequate oxygenation and airway patent Complications related to anesthesia: None Post-anesthesia assessment completed. No concerns Signed By: Pepe Headley MD   
 November 28, 2018 Procedure(s): 
COLONOSCOPY 
ENDOSCOPIC POLYPECTOMY. 
 
<BSHSIANPOST> Visit Vitals BP (!) 148/94 Pulse 71 Temp 36.5 °C (97.7 °F) Resp 11 Ht 5' 10\" (1.778 m) Wt 87.8 kg (193 lb 8 oz) SpO2 96% BMI 27.76 kg/m²

## 2018-11-28 NOTE — PROCEDURES
Marivel Fischer  [id number]  1960    Colonoscopy Operative Report    Procedure Type:   Colonoscopy with snare polypectomy. Pre-operative Diagnosis:  Need for colorectal cancer screening. Post-operative Diagnosis:  Anal canal polyp versus hypertrophic anal papilla. Surgeon:  Karolina Lund MD    Referring Provider: Ruiz Lancaster MD    Sedation and Analgesia:  MAC anesthesia Propofol (350 mg)    Specimens Removed:  Polyp. EBL:  0 mL. Complications: None apparent. Indication For Procedure: The patient is an asymptomatic 75-year-old male whose last colonoscopy was performed on 4/11/2008. That procedure was performed to evaluate rectal bleeding, and it revealed somewhat enlarged external hemorrhoids and a superficial posterior anal fissure that was located just off the midline. There were no masses, neoplasms or diverticula identified. Another colonoscopy is indicated for colorectal cancer screening. Findings: There was a polypoid lesion at the junction of the proximal anal canal and the distal rectum. The colon and rectum were otherwise normal.    Procedure Details:  After informed consent was obtained, the patient was taken to the endoscopy suite where standard monitoring devices were attached and intravenous access was established. Sedation was administered by the anesthetist as needed throughout the procedure. The patient was placed in the left lateral decubitus position, and inspection of the perineum revealed no significant external lesions. Digital rectal examination revealed no masses. The Olympus videocolonoscope was lubricated and inserted transanally into the rectum. It was advanced into the colon and without difficulty to the cecum, which was identified by the presence of the ileocecal valve and the appendiceal orifice. The quality of the bowel preparation was good, as was the overall visualization.   Careful inspection was performed during withdrawal of the colonoscope. Upon retroflexion, a sessile polypoid mass was identified as described above. It appeared to be most consistent with a hypertrophic anal papilla, but I was not completely certain that it was not a neoplasm. The lesion was therefore excised using the hot snare with excellent hemostasis. There were no apparent complications, and the patient appeared to have tolerated the procedure well. At its conclusion, he was transported to the recovery area in good condition. Impression:    The polypoid lesion had a benign appearance. Recommendations:   I will contact the patient with the pathology results and make a follow-up recommendation at that time.

## (undated) DEVICE — KIT IV STRT W CHLORAPREP PD 1ML

## (undated) DEVICE — BLADELESS OPTICAL TROCAR WITH FIXATION CANNULA: Brand: VERSAPORT

## (undated) DEVICE — Z DISCONTINUED NO SUB IDED SET EXTN W/ 4 W STPCOCK M SPIN LOK 36IN

## (undated) DEVICE — NEONATAL-ADULT SPO2 SENSOR: Brand: NELLCOR

## (undated) DEVICE — STAPLER INT HERN BLK TI W/ 4.8MM STPL DISP MULTFI VERSATACK

## (undated) DEVICE — SUTURE MCRYL SZ 4-0 L18IN ABSRB UD L19MM PS-2 3/8 CIR PRIM Y496G

## (undated) DEVICE — Device

## (undated) DEVICE — INFECTION CONTROL KIT SYS

## (undated) DEVICE — SUTURE SZ 0 27IN 5/8 CIR UR-6  TAPER PT VIOLET ABSRB VICRYL J603H

## (undated) DEVICE — STERILE POLYISOPRENE POWDER-FREE SURGICAL GLOVES WITH EMOLLIENT COATING: Brand: PROTEXIS

## (undated) DEVICE — DRAPE,UTILTY,TAPE,15X26, 4EA/PK: Brand: MEDLINE

## (undated) DEVICE — CONNECTOR TBNG AUX H2O JET DISP FOR OLY 160/180 SER

## (undated) DEVICE — DEVON™ KNEE AND BODY STRAP 60" X 3" (1.5 M X 7.6 CM): Brand: DEVON

## (undated) DEVICE — KENDALL RADIOLUCENT FOAM MONITORING ELECTRODE -RECTANGULAR SHAPE: Brand: KENDALL

## (undated) DEVICE — CANN NASAL O2 CAPNOGRAPHY AD -- FILTERLINE

## (undated) DEVICE — SYR 50ML SLIP TIP NSAF LF STRL --

## (undated) DEVICE — NEEDLE HYPO 18GA L1.5IN PNK S STL HUB POLYPR SHLD REG BVL

## (undated) DEVICE — TROCAR SITE CLOSURE DEVICE: Brand: ENDO CLOSE

## (undated) DEVICE — SOLUTION IV 1000ML 0.9% SOD CHL

## (undated) DEVICE — REM POLYHESIVE ADULT PATIENT RETURN ELECTRODE: Brand: VALLEYLAB

## (undated) DEVICE — SNARE ENDOSCP M L240CM W27MM SHTH DIA2.4MM CHN 2.8MM OVL

## (undated) DEVICE — TUBING INSUFLTN 10FT LUER -- CONVERT TO ITEM 368568

## (undated) DEVICE — ENDO CARRY-ON PROCEDURE KIT INCLUDES ENZYMATIC SPONGE, GAUZE, BIOHAZARD LABEL, TRAY, LUBRICANT, DIRTY SCOPE LABEL, WATER LABEL, TRAY, DRAWSTRING PAD, AND DEFENDO 4-PIECE KIT.: Brand: ENDO CARRY-ON PROCEDURE KIT

## (undated) DEVICE — SOLIDIFIER FLUID 3000 CC ABSORB

## (undated) DEVICE — TRAP SURG QUAD PARABOLA SLOT DSGN SFTY SCRN TRAPEASE

## (undated) DEVICE — UNIVERSAL FIXATION CANNULA: Brand: VERSAONE

## (undated) DEVICE — SUTURE ETHBND EXCEL SZ 0 L18IN NONABSORBABLE GRN L26MM CT-2 CX27D

## (undated) DEVICE — SET ADMIN 16ML TBNG L100IN 2 Y INJ SITE IV PIGGY BK DISP

## (undated) DEVICE — BAG SPEC BIOHZD LF 2MIL 6X10IN -- CONVERT TO ITEM 357326

## (undated) DEVICE — CATH IV AUTOGRD BC BLU 22GA 25 -- INSYTE

## (undated) DEVICE — BAG BELONG PT PERS CLEAR HANDL

## (undated) DEVICE — QUILTED PREMIUM COMFORT UNDERPAD,EXTRA HEAVY: Brand: WINGS

## (undated) DEVICE — APPLICATOR BNDG 1MM ADH PREMIERPRO EXOFIN

## (undated) DEVICE — BW-412T DISP COMBO CLEANING BRUSH: Brand: SINGLE USE COMBINATION CLEANING BRUSH

## (undated) DEVICE — SURGICAL PROCEDURE KIT GEN LAPAROSCOPY LF

## (undated) DEVICE — AIRLIFE™ U/CONNECT-IT OXYGEN TUBING 7 FEET (2.1 M) CRUSH-RESISTANT OXYGEN TUBING, VINYL TIPPED: Brand: AIRLIFE™

## (undated) DEVICE — (D)PREP SKN CHLRAPRP APPL 26ML -- CONVERT TO ITEM 371833

## (undated) DEVICE — SUTURE VCRL SZ 2-0 L27IN ABSRB UD L26MM SH 1/2 CIR J417H

## (undated) DEVICE — CONTAINER SPEC 20 ML LID NEUT BUFF FORMALIN 10 % POLYPR STS

## (undated) DEVICE — NEEDLE HYPO 22GA L1.5IN BLK S STL HUB POLYPR SHLD REG BVL

## (undated) DEVICE — 1200 GUARD II KIT W/5MM TUBE W/O VAC TUBE: Brand: GUARDIAN

## (undated) DEVICE — SUTURE VCRL SZ 3-0 L27IN ABSRB UD L26MM SH 1/2 CIR J416H

## (undated) DEVICE — Z INACTIVATING USE 2752259 STAPLER INT DIA12MM 65DEG BLK 6 ROW 10 TI STPL PRELD DISP

## (undated) DEVICE — CLICKLINE SCISSORS INSERT: Brand: CLICKLINE

## (undated) DEVICE — BLADELESS OPTICAL TROCAR WITH FIXATION CANNULA: Brand: VERSAONE

## (undated) DEVICE — SYRINGE MED 20ML STD CLR PLAS LUERLOCK TIP N CTRL DISP

## (undated) DEVICE — KENDALL SCD EXPRESS SLEEVES, KNEE LENGTH, MEDIUM: Brand: KENDALL SCD

## (undated) DEVICE — 3000CC GUARDIAN II: Brand: GUARDIAN

## (undated) DEVICE — Z DISCONTINUED USE 2751540 TUBING IRRIG L10IN DISP PMP ENDOGATOR